# Patient Record
Sex: MALE | Race: WHITE | NOT HISPANIC OR LATINO | Employment: OTHER | URBAN - METROPOLITAN AREA
[De-identification: names, ages, dates, MRNs, and addresses within clinical notes are randomized per-mention and may not be internally consistent; named-entity substitution may affect disease eponyms.]

---

## 2018-02-27 ENCOUNTER — HOSPITAL ENCOUNTER (EMERGENCY)
Facility: HOSPITAL | Age: 73
Discharge: HOME | End: 2018-02-27
Attending: EMERGENCY MEDICINE
Payer: MEDICARE

## 2018-02-27 VITALS
HEIGHT: 70 IN | RESPIRATION RATE: 20 BRPM | OXYGEN SATURATION: 98 % | TEMPERATURE: 98 F | DIASTOLIC BLOOD PRESSURE: 80 MMHG | HEART RATE: 70 BPM | BODY MASS INDEX: 25.77 KG/M2 | WEIGHT: 180 LBS | SYSTOLIC BLOOD PRESSURE: 135 MMHG

## 2018-02-27 DIAGNOSIS — T83.098A: Primary | ICD-10-CM

## 2018-02-27 PROCEDURE — 99283 EMERGENCY DEPT VISIT LOW MDM: CPT

## 2018-02-27 NOTE — ED NOTES
Leg bag changed, bag draining to gravity, NAD noted. Villa education completed. Pt to follow up with pcp for villa removal. [facility]

## 2018-02-27 NOTE — ED PROVIDER NOTES
Encounter Date: 2/27/2018    SCRIBE #1 NOTE: I, Keyla Troy, am scribing for, and in the presence of, Dr. Dutta.       History     Chief Complaint   Patient presents with    catheter bag replacement     72 year old male presents to ed cc of leg bag replacement patient had villa catheter placed 2- 3 weeks ago. Patient was not educated on how to change leg bag states bag has tear in it        has no past medical history on file.    Time seen by provider: 2:58 PM     This is a 72 y.o. male who presents to the emergency department today with complaint of leaking catheter leg bag, in place since a hospital admission on February 19th. He and his relative state they do not know how to change the bag, patient's relative states she has frequent memory loss. He has a recent history of urinary retention requiring Villa catheter sine August 2017.        The history is provided by the patient and a relative.     Review of patient's allergies indicates:  No Known Allergies  History reviewed. No pertinent past medical history.  History reviewed. No pertinent surgical history.  History reviewed. No pertinent family history.  Social History   Substance Use Topics    Smoking status: Former Smoker    Smokeless tobacco: Not on file    Alcohol use No     Review of Systems   Constitutional: Negative for activity change, fatigue and fever.   HENT: Negative for congestion, ear pain and sore throat.    Eyes: Negative for photophobia and redness.   Respiratory: Negative for cough and shortness of breath.    Cardiovascular: Negative for chest pain.   Gastrointestinal: Negative for abdominal pain, diarrhea, nausea and vomiting.   Genitourinary: Negative for decreased urine volume and flank pain.        Urinary retention, Villa catheter leaking.   Musculoskeletal: Negative for back pain and myalgias.   Skin: Negative for rash and wound.   Neurological: Negative for dizziness, weakness, numbness and headaches.       Physical Exam      Initial Vitals [02/27/18 1416]   BP Pulse Resp Temp SpO2   135/80 70 20 98.2 °F (36.8 °C) 98 %      MAP       98.33         Physical Exam    Nursing note and vitals reviewed.  Constitutional: He appears well-developed. No distress.   HENT:   Head: Normocephalic and atraumatic.   Eyes: Conjunctivae are normal.   Neck: Normal range of motion. Neck supple.   Abdominal: Soft. He exhibits no distension.   Genitourinary:   Genitourinary Comments: Sousa catheter in place with leg bag, leaking, urine clear   Musculoskeletal: Normal range of motion.   Neurological: He is alert and oriented to person, place, and time. He has normal strength.   Skin: Skin is warm and dry.   Psychiatric: He has a normal mood and affect.         ED Course   Procedures  Labs Reviewed - No data to display          Medical Decision Making:   ED Management:  Bag is not draining and is leaking urine. Bag was replaced with the patient's supplies and is flowing. He will be discharged.                      Clinical Impression:   The encounter diagnosis was Obstruction of urinary catheter, initial encounter.       I, Phuong Dutta, personally performed the services described in this documentation. All medical record entries made by the scribe were at my direction and in my presence.  I have reviewed the chart and agree that the record reflects my personal performance and is accurate and complete. Phuong Dutta M.D. 4:01 PM02/27/2018                  Phuong Dutta MD  02/27/18 1606

## 2018-02-27 NOTE — ED NOTES
"Per pt vilal placed x3 weeks ago after hospitalization for a fall. Endorses intermitt bladder spasms that improve with "the med the doc gave me." denies any pain with urination, denies any change with urine output or appearance, denies any abd pain. Per pt "they didn't show me how to change my urine bag when I left the hospital." wife @ bedside. Will continue to monitor pt.   "

## 2019-01-01 ENCOUNTER — HOSPITAL ENCOUNTER (OUTPATIENT)
Facility: HOSPITAL | Age: 74
Discharge: HOME | End: 2019-12-06
Attending: EMERGENCY MEDICINE | Admitting: FAMILY MEDICINE
Payer: MEDICARE

## 2019-01-01 VITALS
BODY MASS INDEX: 20.13 KG/M2 | HEART RATE: 50 BPM | OXYGEN SATURATION: 99 % | DIASTOLIC BLOOD PRESSURE: 70 MMHG | HEIGHT: 70 IN | TEMPERATURE: 99 F | WEIGHT: 140.63 LBS | SYSTOLIC BLOOD PRESSURE: 158 MMHG | RESPIRATION RATE: 18 BRPM

## 2019-01-01 DIAGNOSIS — R79.89 ELEVATED TROPONIN: ICD-10-CM

## 2019-01-01 DIAGNOSIS — R00.1 BRADYCARDIA: ICD-10-CM

## 2019-01-01 DIAGNOSIS — R29.6 FALLS FREQUENTLY: ICD-10-CM

## 2019-01-01 DIAGNOSIS — E11.9 TYPE 2 DIABETES MELLITUS WITHOUT COMPLICATION, WITHOUT LONG-TERM CURRENT USE OF INSULIN: ICD-10-CM

## 2019-01-01 DIAGNOSIS — R33.9 URINARY RETENTION: ICD-10-CM

## 2019-01-01 DIAGNOSIS — R53.1 GENERALIZED WEAKNESS: ICD-10-CM

## 2019-01-01 DIAGNOSIS — N30.00 ACUTE CYSTITIS WITHOUT HEMATURIA: ICD-10-CM

## 2019-01-01 DIAGNOSIS — T83.511A URINARY TRACT INFECTION ASSOCIATED WITH INDWELLING URETHRAL CATHETER, INITIAL ENCOUNTER: Primary | ICD-10-CM

## 2019-01-01 DIAGNOSIS — N39.0 URINARY TRACT INFECTION ASSOCIATED WITH INDWELLING URETHRAL CATHETER, INITIAL ENCOUNTER: Primary | ICD-10-CM

## 2019-01-01 LAB
ALBUMIN SERPL BCP-MCNC: 4.1 G/DL (ref 3.5–5.2)
ALP SERPL-CCNC: 85 U/L (ref 55–135)
ALT SERPL W/O P-5'-P-CCNC: 9 U/L (ref 10–44)
ANION GAP SERPL CALC-SCNC: 10 MMOL/L (ref 8–16)
ANION GAP SERPL CALC-SCNC: 11 MMOL/L (ref 8–16)
ANION GAP SERPL CALC-SCNC: 9 MMOL/L (ref 8–16)
ANION GAP SERPL CALC-SCNC: 9 MMOL/L (ref 8–16)
AST SERPL-CCNC: 18 U/L (ref 10–40)
BACTERIA #/AREA URNS HPF: ABNORMAL /HPF
BACTERIA UR CULT: NORMAL
BACTERIA UR CULT: NORMAL
BASOPHILS # BLD AUTO: 0.03 K/UL (ref 0–0.2)
BASOPHILS # BLD AUTO: 0.03 K/UL (ref 0–0.2)
BASOPHILS # BLD AUTO: 0.04 K/UL (ref 0–0.2)
BASOPHILS # BLD AUTO: 0.04 K/UL (ref 0–0.2)
BASOPHILS NFR BLD: 0.3 % (ref 0–1.9)
BASOPHILS NFR BLD: 0.4 % (ref 0–1.9)
BASOPHILS NFR BLD: 0.5 % (ref 0–1.9)
BASOPHILS NFR BLD: 0.6 % (ref 0–1.9)
BILIRUB SERPL-MCNC: 0.6 MG/DL (ref 0.1–1)
BILIRUB UR QL STRIP: NEGATIVE
BUN SERPL-MCNC: 10 MG/DL (ref 8–23)
BUN SERPL-MCNC: 13 MG/DL (ref 8–23)
BUN SERPL-MCNC: 15 MG/DL (ref 8–23)
BUN SERPL-MCNC: 9 MG/DL (ref 8–23)
CALCIUM SERPL-MCNC: 10.2 MG/DL (ref 8.7–10.5)
CALCIUM SERPL-MCNC: 9.3 MG/DL (ref 8.7–10.5)
CALCIUM SERPL-MCNC: 9.4 MG/DL (ref 8.7–10.5)
CALCIUM SERPL-MCNC: 9.7 MG/DL (ref 8.7–10.5)
CHLORIDE SERPL-SCNC: 103 MMOL/L (ref 95–110)
CHLORIDE SERPL-SCNC: 104 MMOL/L (ref 95–110)
CHLORIDE SERPL-SCNC: 104 MMOL/L (ref 95–110)
CHLORIDE SERPL-SCNC: 107 MMOL/L (ref 95–110)
CLARITY UR: ABNORMAL
CO2 SERPL-SCNC: 25 MMOL/L (ref 23–29)
CO2 SERPL-SCNC: 25 MMOL/L (ref 23–29)
CO2 SERPL-SCNC: 26 MMOL/L (ref 23–29)
CO2 SERPL-SCNC: 28 MMOL/L (ref 23–29)
COLOR UR: YELLOW
CREAT SERPL-MCNC: 0.7 MG/DL (ref 0.5–1.4)
CREAT SERPL-MCNC: 0.7 MG/DL (ref 0.5–1.4)
CREAT SERPL-MCNC: 0.8 MG/DL (ref 0.5–1.4)
CREAT SERPL-MCNC: 0.9 MG/DL (ref 0.5–1.4)
DIFFERENTIAL METHOD: ABNORMAL
EOSINOPHIL # BLD AUTO: 0.2 K/UL (ref 0–0.5)
EOSINOPHIL # BLD AUTO: 0.3 K/UL (ref 0–0.5)
EOSINOPHIL # BLD AUTO: 0.3 K/UL (ref 0–0.5)
EOSINOPHIL # BLD AUTO: 0.4 K/UL (ref 0–0.5)
EOSINOPHIL NFR BLD: 2.4 % (ref 0–8)
EOSINOPHIL NFR BLD: 3.9 % (ref 0–8)
EOSINOPHIL NFR BLD: 4.7 % (ref 0–8)
EOSINOPHIL NFR BLD: 5.6 % (ref 0–8)
ERYTHROCYTE [DISTWIDTH] IN BLOOD BY AUTOMATED COUNT: 15.7 % (ref 11.5–14.5)
EST. GFR  (AFRICAN AMERICAN): >60 ML/MIN/1.73 M^2
EST. GFR  (NON AFRICAN AMERICAN): >60 ML/MIN/1.73 M^2
ESTIMATED AVG GLUCOSE: 103 MG/DL (ref 68–131)
GLUCOSE SERPL-MCNC: 106 MG/DL (ref 70–110)
GLUCOSE SERPL-MCNC: 111 MG/DL (ref 70–110)
GLUCOSE SERPL-MCNC: 112 MG/DL (ref 70–110)
GLUCOSE SERPL-MCNC: 91 MG/DL (ref 70–110)
GLUCOSE UR QL STRIP: NEGATIVE
HBA1C MFR BLD HPLC: 5.2 % (ref 4–5.6)
HCT VFR BLD AUTO: 31.2 % (ref 40–54)
HCT VFR BLD AUTO: 34.8 % (ref 40–54)
HCT VFR BLD AUTO: 35 % (ref 40–54)
HCT VFR BLD AUTO: 36 % (ref 40–54)
HGB BLD-MCNC: 10.9 G/DL (ref 14–18)
HGB BLD-MCNC: 10.9 G/DL (ref 14–18)
HGB BLD-MCNC: 11.4 G/DL (ref 14–18)
HGB BLD-MCNC: 9.9 G/DL (ref 14–18)
HGB UR QL STRIP: ABNORMAL
HYALINE CASTS #/AREA URNS LPF: 1 /LPF
KETONES UR QL STRIP: NEGATIVE
LEUKOCYTE ESTERASE UR QL STRIP: ABNORMAL
LYMPHOCYTES # BLD AUTO: 0.7 K/UL (ref 1–4.8)
LYMPHOCYTES # BLD AUTO: 0.7 K/UL (ref 1–4.8)
LYMPHOCYTES # BLD AUTO: 0.8 K/UL (ref 1–4.8)
LYMPHOCYTES # BLD AUTO: 1 K/UL (ref 1–4.8)
LYMPHOCYTES NFR BLD: 10.1 % (ref 18–48)
LYMPHOCYTES NFR BLD: 10.2 % (ref 18–48)
LYMPHOCYTES NFR BLD: 15 % (ref 18–48)
LYMPHOCYTES NFR BLD: 8 % (ref 18–48)
MAGNESIUM SERPL-MCNC: 1.9 MG/DL (ref 1.6–2.6)
MAGNESIUM SERPL-MCNC: 2 MG/DL (ref 1.6–2.6)
MAGNESIUM SERPL-MCNC: 2.1 MG/DL (ref 1.6–2.6)
MAGNESIUM SERPL-MCNC: 2.1 MG/DL (ref 1.6–2.6)
MCH RBC QN AUTO: 26 PG (ref 27–31)
MCH RBC QN AUTO: 26.3 PG (ref 27–31)
MCH RBC QN AUTO: 26.5 PG (ref 27–31)
MCH RBC QN AUTO: 26.6 PG (ref 27–31)
MCHC RBC AUTO-ENTMCNC: 31.1 G/DL (ref 32–36)
MCHC RBC AUTO-ENTMCNC: 31.3 G/DL (ref 32–36)
MCHC RBC AUTO-ENTMCNC: 31.7 G/DL (ref 32–36)
MCHC RBC AUTO-ENTMCNC: 31.7 G/DL (ref 32–36)
MCV RBC AUTO: 83 FL (ref 82–98)
MCV RBC AUTO: 84 FL (ref 82–98)
MICROSCOPIC COMMENT: ABNORMAL
MONOCYTES # BLD AUTO: 0.7 K/UL (ref 0.3–1)
MONOCYTES # BLD AUTO: 0.9 K/UL (ref 0.3–1)
MONOCYTES NFR BLD: 11.4 % (ref 4–15)
MONOCYTES NFR BLD: 12.1 % (ref 4–15)
MONOCYTES NFR BLD: 14 % (ref 4–15)
MONOCYTES NFR BLD: 8.5 % (ref 4–15)
NEUTROPHILS # BLD AUTO: 4.2 K/UL (ref 1.8–7.7)
NEUTROPHILS # BLD AUTO: 5.2 K/UL (ref 1.8–7.7)
NEUTROPHILS # BLD AUTO: 5.9 K/UL (ref 1.8–7.7)
NEUTROPHILS # BLD AUTO: 7 K/UL (ref 1.8–7.7)
NEUTROPHILS NFR BLD: 64.8 % (ref 38–73)
NEUTROPHILS NFR BLD: 72.7 % (ref 38–73)
NEUTROPHILS NFR BLD: 74 % (ref 38–73)
NEUTROPHILS NFR BLD: 80.8 % (ref 38–73)
NITRITE UR QL STRIP: POSITIVE
PH UR STRIP: 7 [PH] (ref 5–8)
PLATELET # BLD AUTO: 294 K/UL (ref 150–350)
PLATELET # BLD AUTO: 315 K/UL (ref 150–350)
PLATELET # BLD AUTO: 338 K/UL (ref 150–350)
PLATELET # BLD AUTO: 341 K/UL (ref 150–350)
PMV BLD AUTO: 8.9 FL (ref 9.2–12.9)
PMV BLD AUTO: 9 FL (ref 9.2–12.9)
POCT GLUCOSE: 100 MG/DL (ref 70–110)
POCT GLUCOSE: 101 MG/DL (ref 70–110)
POCT GLUCOSE: 108 MG/DL (ref 70–110)
POCT GLUCOSE: 110 MG/DL (ref 70–110)
POCT GLUCOSE: 114 MG/DL (ref 70–110)
POCT GLUCOSE: 115 MG/DL (ref 70–110)
POCT GLUCOSE: 122 MG/DL (ref 70–110)
POCT GLUCOSE: 170 MG/DL (ref 70–110)
POCT GLUCOSE: 91 MG/DL (ref 70–110)
POCT GLUCOSE: 97 MG/DL (ref 70–110)
POTASSIUM SERPL-SCNC: 3.4 MMOL/L (ref 3.5–5.1)
POTASSIUM SERPL-SCNC: 3.8 MMOL/L (ref 3.5–5.1)
POTASSIUM SERPL-SCNC: 3.9 MMOL/L (ref 3.5–5.1)
POTASSIUM SERPL-SCNC: 4.5 MMOL/L (ref 3.5–5.1)
PROT SERPL-MCNC: 7.5 G/DL (ref 6–8.4)
PROT UR QL STRIP: ABNORMAL
RBC # BLD AUTO: 3.74 M/UL (ref 4.6–6.2)
RBC # BLD AUTO: 4.15 M/UL (ref 4.6–6.2)
RBC # BLD AUTO: 4.19 M/UL (ref 4.6–6.2)
RBC # BLD AUTO: 4.28 M/UL (ref 4.6–6.2)
RBC #/AREA URNS HPF: 1 /HPF (ref 0–4)
SODIUM SERPL-SCNC: 139 MMOL/L (ref 136–145)
SODIUM SERPL-SCNC: 140 MMOL/L (ref 136–145)
SODIUM SERPL-SCNC: 141 MMOL/L (ref 136–145)
SODIUM SERPL-SCNC: 141 MMOL/L (ref 136–145)
SP GR UR STRIP: 1.02 (ref 1–1.03)
T4 FREE SERPL-MCNC: 1.05 NG/DL (ref 0.71–1.51)
TROPONIN I SERPL DL<=0.01 NG/ML-MCNC: 0.05 NG/ML (ref 0–0.03)
TROPONIN I SERPL DL<=0.01 NG/ML-MCNC: 0.06 NG/ML (ref 0–0.03)
TROPONIN I SERPL DL<=0.01 NG/ML-MCNC: 0.07 NG/ML (ref 0–0.03)
TSH SERPL DL<=0.005 MIU/L-ACNC: 0.3 UIU/ML (ref 0.4–4)
URN SPEC COLLECT METH UR: ABNORMAL
UROBILINOGEN UR STRIP-ACNC: NEGATIVE EU/DL
WBC # BLD AUTO: 6.45 K/UL (ref 3.9–12.7)
WBC # BLD AUTO: 7.21 K/UL (ref 3.9–12.7)
WBC # BLD AUTO: 7.97 K/UL (ref 3.9–12.7)
WBC # BLD AUTO: 8.7 K/UL (ref 3.9–12.7)
WBC #/AREA URNS HPF: 40 /HPF (ref 0–5)
WBC CLUMPS URNS QL MICRO: ABNORMAL

## 2019-01-01 PROCEDURE — 86580 TB INTRADERMAL TEST: CPT | Performed by: FAMILY MEDICINE

## 2019-01-01 PROCEDURE — 83735 ASSAY OF MAGNESIUM: CPT

## 2019-01-01 PROCEDURE — 63600175 PHARM REV CODE 636 W HCPCS: Performed by: PHYSICIAN ASSISTANT

## 2019-01-01 PROCEDURE — 85025 COMPLETE CBC W/AUTO DIFF WBC: CPT

## 2019-01-01 PROCEDURE — G0378 HOSPITAL OBSERVATION PER HR: HCPCS

## 2019-01-01 PROCEDURE — 97530 THERAPEUTIC ACTIVITIES: CPT

## 2019-01-01 PROCEDURE — 83036 HEMOGLOBIN GLYCOSYLATED A1C: CPT

## 2019-01-01 PROCEDURE — 84484 ASSAY OF TROPONIN QUANT: CPT

## 2019-01-01 PROCEDURE — 25000003 PHARM REV CODE 250: Performed by: NURSE PRACTITIONER

## 2019-01-01 PROCEDURE — 81000 URINALYSIS NONAUTO W/SCOPE: CPT

## 2019-01-01 PROCEDURE — 99285 EMERGENCY DEPT VISIT HI MDM: CPT | Mod: 25

## 2019-01-01 PROCEDURE — 84484 ASSAY OF TROPONIN QUANT: CPT | Mod: 91

## 2019-01-01 PROCEDURE — 80048 BASIC METABOLIC PNL TOTAL CA: CPT

## 2019-01-01 PROCEDURE — 97116 GAIT TRAINING THERAPY: CPT | Mod: 59

## 2019-01-01 PROCEDURE — 82962 GLUCOSE BLOOD TEST: CPT

## 2019-01-01 PROCEDURE — 93005 ELECTROCARDIOGRAM TRACING: CPT

## 2019-01-01 PROCEDURE — 96361 HYDRATE IV INFUSION ADD-ON: CPT

## 2019-01-01 PROCEDURE — 80053 COMPREHEN METABOLIC PANEL: CPT

## 2019-01-01 PROCEDURE — 97110 THERAPEUTIC EXERCISES: CPT

## 2019-01-01 PROCEDURE — 97116 GAIT TRAINING THERAPY: CPT

## 2019-01-01 PROCEDURE — 36415 COLL VENOUS BLD VENIPUNCTURE: CPT

## 2019-01-01 PROCEDURE — 97161 PT EVAL LOW COMPLEX 20 MIN: CPT

## 2019-01-01 PROCEDURE — 84443 ASSAY THYROID STIM HORMONE: CPT

## 2019-01-01 PROCEDURE — 97165 OT EVAL LOW COMPLEX 30 MIN: CPT

## 2019-01-01 PROCEDURE — 63600175 PHARM REV CODE 636 W HCPCS: Performed by: NURSE PRACTITIONER

## 2019-01-01 PROCEDURE — 84439 ASSAY OF FREE THYROXINE: CPT

## 2019-01-01 PROCEDURE — 96365 THER/PROPH/DIAG IV INF INIT: CPT

## 2019-01-01 PROCEDURE — 96372 THER/PROPH/DIAG INJ SC/IM: CPT

## 2019-01-01 PROCEDURE — 30200315 PPD INTRADERMAL TEST REV CODE 302: Performed by: FAMILY MEDICINE

## 2019-01-01 PROCEDURE — 96376 TX/PRO/DX INJ SAME DRUG ADON: CPT

## 2019-01-01 PROCEDURE — 87086 URINE CULTURE/COLONY COUNT: CPT

## 2019-01-01 RX ORDER — AMOXICILLIN AND CLAVULANATE POTASSIUM 875; 125 MG/1; MG/1
1 TABLET, FILM COATED ORAL EVERY 12 HOURS
Status: DISCONTINUED | OUTPATIENT
Start: 2019-01-01 | End: 2019-01-01 | Stop reason: HOSPADM

## 2019-01-01 RX ORDER — BROMFENAC SODIUM 0.81 MG/ML
SOLUTION/ DROPS OPHTHALMIC 2 TIMES DAILY
Status: ON HOLD | COMMUNITY
End: 2019-01-01

## 2019-01-01 RX ORDER — METOPROLOL SUCCINATE 50 MG/1
50 TABLET, EXTENDED RELEASE ORAL 3 TIMES DAILY
COMMUNITY

## 2019-01-01 RX ORDER — SERTRALINE HYDROCHLORIDE 100 MG/1
100 TABLET, FILM COATED ORAL DAILY
Status: DISCONTINUED | OUTPATIENT
Start: 2019-01-01 | End: 2019-01-01 | Stop reason: HOSPADM

## 2019-01-01 RX ORDER — MELOXICAM 7.5 MG/1
7.5 TABLET ORAL DAILY
COMMUNITY

## 2019-01-01 RX ORDER — AMOXICILLIN AND CLAVULANATE POTASSIUM 875; 125 MG/1; MG/1
1 TABLET, FILM COATED ORAL EVERY 12 HOURS
Qty: 10 TABLET | Refills: 0
Start: 2019-01-01 | End: 2019-01-01

## 2019-01-01 RX ORDER — ACETAMINOPHEN 325 MG/1
650 TABLET ORAL EVERY 4 HOURS PRN
Status: DISCONTINUED | OUTPATIENT
Start: 2019-01-01 | End: 2019-01-01 | Stop reason: HOSPADM

## 2019-01-01 RX ORDER — METOPROLOL SUCCINATE 50 MG/1
50 TABLET, EXTENDED RELEASE ORAL 2 TIMES DAILY
Status: DISCONTINUED | OUTPATIENT
Start: 2019-01-01 | End: 2019-01-01 | Stop reason: HOSPADM

## 2019-01-01 RX ORDER — ENOXAPARIN SODIUM 100 MG/ML
40 INJECTION SUBCUTANEOUS EVERY 24 HOURS
Status: DISCONTINUED | OUTPATIENT
Start: 2019-01-01 | End: 2019-01-01 | Stop reason: HOSPADM

## 2019-01-01 RX ORDER — GLUCAGON 1 MG
1 KIT INJECTION
Status: DISCONTINUED | OUTPATIENT
Start: 2019-01-01 | End: 2019-01-01 | Stop reason: HOSPADM

## 2019-01-01 RX ORDER — ATORVASTATIN CALCIUM 20 MG/1
20 TABLET, FILM COATED ORAL NIGHTLY
Status: DISCONTINUED | OUTPATIENT
Start: 2019-01-01 | End: 2019-01-01 | Stop reason: HOSPADM

## 2019-01-01 RX ORDER — ONDANSETRON 4 MG/1
4 TABLET, ORALLY DISINTEGRATING ORAL EVERY 6 HOURS PRN
Status: DISCONTINUED | OUTPATIENT
Start: 2019-01-01 | End: 2019-01-01 | Stop reason: HOSPADM

## 2019-01-01 RX ORDER — ONDANSETRON 2 MG/ML
4 INJECTION INTRAMUSCULAR; INTRAVENOUS EVERY 6 HOURS PRN
Status: DISCONTINUED | OUTPATIENT
Start: 2019-01-01 | End: 2019-01-01 | Stop reason: HOSPADM

## 2019-01-01 RX ORDER — ATORVASTATIN CALCIUM 20 MG/1
20 TABLET, FILM COATED ORAL NIGHTLY
COMMUNITY

## 2019-01-01 RX ORDER — ONDANSETRON 4 MG/1
4 TABLET, FILM COATED ORAL EVERY 8 HOURS
COMMUNITY

## 2019-01-01 RX ORDER — IBUPROFEN 200 MG
24 TABLET ORAL
Status: DISCONTINUED | OUTPATIENT
Start: 2019-01-01 | End: 2019-01-01 | Stop reason: HOSPADM

## 2019-01-01 RX ORDER — DOCUSATE SODIUM 100 MG/1
200 CAPSULE, LIQUID FILLED ORAL DAILY
Status: DISCONTINUED | OUTPATIENT
Start: 2019-01-01 | End: 2019-01-01 | Stop reason: HOSPADM

## 2019-01-01 RX ORDER — SERTRALINE HYDROCHLORIDE 100 MG/1
100 TABLET, FILM COATED ORAL DAILY
COMMUNITY

## 2019-01-01 RX ORDER — HYDRALAZINE HYDROCHLORIDE 50 MG/1
50 TABLET, FILM COATED ORAL EVERY 8 HOURS
COMMUNITY

## 2019-01-01 RX ORDER — OLANZAPINE 2.5 MG/1
2.5 TABLET ORAL NIGHTLY
Status: DISCONTINUED | OUTPATIENT
Start: 2019-01-01 | End: 2019-01-01 | Stop reason: HOSPADM

## 2019-01-01 RX ORDER — IBUPROFEN 200 MG
16 TABLET ORAL
Status: DISCONTINUED | OUTPATIENT
Start: 2019-01-01 | End: 2019-01-01 | Stop reason: HOSPADM

## 2019-01-01 RX ORDER — DOCUSATE SODIUM 100 MG/1
240 CAPSULE, LIQUID FILLED ORAL DAILY
COMMUNITY

## 2019-01-01 RX ORDER — OXYBUTYNIN CHLORIDE 5 MG/1
5 TABLET ORAL 2 TIMES DAILY
COMMUNITY

## 2019-01-01 RX ORDER — CYCLOSPORINE 0.5 MG/ML
1 EMULSION OPHTHALMIC 2 TIMES DAILY
Qty: 1 EACH | Refills: 11
Start: 2019-01-01 | End: 2020-12-05

## 2019-01-01 RX ORDER — INSULIN ASPART 100 [IU]/ML
0-5 INJECTION, SOLUTION INTRAVENOUS; SUBCUTANEOUS
Status: DISCONTINUED | OUTPATIENT
Start: 2019-01-01 | End: 2019-01-01 | Stop reason: HOSPADM

## 2019-01-01 RX ORDER — SOLIFENACIN SUCCINATE 10 MG/1
10 TABLET, FILM COATED ORAL DAILY
Status: ON HOLD | COMMUNITY
End: 2019-01-01 | Stop reason: HOSPADM

## 2019-01-01 RX ORDER — OLANZAPINE 2.5 MG/1
2.5 TABLET ORAL NIGHTLY
COMMUNITY

## 2019-01-01 RX ORDER — HYDRALAZINE HYDROCHLORIDE 25 MG/1
50 TABLET, FILM COATED ORAL EVERY 8 HOURS
Status: DISCONTINUED | OUTPATIENT
Start: 2019-01-01 | End: 2019-01-01 | Stop reason: HOSPADM

## 2019-01-01 RX ORDER — TAMSULOSIN HYDROCHLORIDE 0.4 MG/1
0.4 CAPSULE ORAL DAILY
Status: DISCONTINUED | OUTPATIENT
Start: 2019-01-01 | End: 2019-01-01 | Stop reason: HOSPADM

## 2019-01-01 RX ORDER — TRAZODONE HYDROCHLORIDE 150 MG/1
150 TABLET ORAL NIGHTLY
COMMUNITY

## 2019-01-01 RX ORDER — NITROGLYCERIN 0.4 MG/1
0.4 TABLET SUBLINGUAL
COMMUNITY

## 2019-01-01 RX ORDER — TAMSULOSIN HYDROCHLORIDE 0.4 MG/1
0.4 CAPSULE ORAL DAILY
COMMUNITY

## 2019-01-01 RX ORDER — SODIUM CHLORIDE 0.9 % (FLUSH) 0.9 %
10 SYRINGE (ML) INJECTION
Status: DISCONTINUED | OUTPATIENT
Start: 2019-01-01 | End: 2019-01-01 | Stop reason: HOSPADM

## 2019-01-01 RX ORDER — METFORMIN HYDROCHLORIDE 1000 MG/1
500 TABLET ORAL 2 TIMES DAILY WITH MEALS
COMMUNITY

## 2019-01-01 RX ADMIN — TUBERCULIN PURIFIED PROTEIN DERIVATIVE 5 UNITS: 5 INJECTION INTRADERMAL at 12:12

## 2019-01-01 RX ADMIN — HYDRALAZINE HYDROCHLORIDE 50 MG: 25 TABLET, FILM COATED ORAL at 09:12

## 2019-01-01 RX ADMIN — METOPROLOL SUCCINATE 50 MG: 50 TABLET, EXTENDED RELEASE ORAL at 09:12

## 2019-01-01 RX ADMIN — OLANZAPINE 2.5 MG: 2.5 TABLET, FILM COATED ORAL at 09:12

## 2019-01-01 RX ADMIN — HYDRALAZINE HYDROCHLORIDE 50 MG: 25 TABLET, FILM COATED ORAL at 01:12

## 2019-01-01 RX ADMIN — CEFTRIAXONE 1 G: 1 INJECTION, SOLUTION INTRAVENOUS at 01:12

## 2019-01-01 RX ADMIN — SODIUM CHLORIDE 500 ML: 0.9 INJECTION, SOLUTION INTRAVENOUS at 12:12

## 2019-01-01 RX ADMIN — SERTRALINE HYDROCHLORIDE 100 MG: 50 TABLET ORAL at 10:12

## 2019-01-01 RX ADMIN — OLANZAPINE 2.5 MG: 2.5 TABLET, FILM COATED ORAL at 08:12

## 2019-01-01 RX ADMIN — METOPROLOL SUCCINATE 50 MG: 50 TABLET, EXTENDED RELEASE ORAL at 08:12

## 2019-01-01 RX ADMIN — ATORVASTATIN CALCIUM 20 MG: 20 TABLET, FILM COATED ORAL at 08:12

## 2019-01-01 RX ADMIN — DOCUSATE SODIUM 200 MG: 100 CAPSULE, LIQUID FILLED ORAL at 09:12

## 2019-01-01 RX ADMIN — TRAZODONE HYDROCHLORIDE 150 MG: 100 TABLET ORAL at 08:12

## 2019-01-01 RX ADMIN — SERTRALINE HYDROCHLORIDE 100 MG: 50 TABLET ORAL at 09:12

## 2019-01-01 RX ADMIN — AMOXICILLIN AND CLAVULANATE POTASSIUM 1 TABLET: 875; 125 TABLET, FILM COATED ORAL at 09:12

## 2019-01-01 RX ADMIN — ATORVASTATIN CALCIUM 20 MG: 20 TABLET, FILM COATED ORAL at 09:12

## 2019-01-01 RX ADMIN — HYDRALAZINE HYDROCHLORIDE 50 MG: 25 TABLET, FILM COATED ORAL at 06:12

## 2019-01-01 RX ADMIN — DOCUSATE SODIUM 200 MG: 100 CAPSULE, LIQUID FILLED ORAL at 10:12

## 2019-01-01 RX ADMIN — CEFTRIAXONE 1 G: 1 INJECTION, SOLUTION INTRAVENOUS at 03:12

## 2019-01-01 RX ADMIN — HYDRALAZINE HYDROCHLORIDE 50 MG: 25 TABLET, FILM COATED ORAL at 03:12

## 2019-01-01 RX ADMIN — METOPROLOL SUCCINATE 50 MG: 50 TABLET, EXTENDED RELEASE ORAL at 10:12

## 2019-01-01 RX ADMIN — TAMSULOSIN HYDROCHLORIDE 0.4 MG: 0.4 CAPSULE ORAL at 09:12

## 2019-01-01 RX ADMIN — TRAZODONE HYDROCHLORIDE 150 MG: 100 TABLET ORAL at 09:12

## 2019-01-01 RX ADMIN — ENOXAPARIN SODIUM 40 MG: 100 INJECTION SUBCUTANEOUS at 05:12

## 2019-12-03 PROBLEM — R79.89 ELEVATED TROPONIN: Status: ACTIVE | Noted: 2019-01-01

## 2019-12-03 PROBLEM — E11.9 TYPE 2 DIABETES MELLITUS WITHOUT COMPLICATION, WITHOUT LONG-TERM CURRENT USE OF INSULIN: Status: ACTIVE | Noted: 2019-01-01

## 2019-12-03 PROBLEM — I10 HYPERTENSION: Status: ACTIVE | Noted: 2019-01-01

## 2019-12-03 PROBLEM — R29.6 FALLS FREQUENTLY: Status: ACTIVE | Noted: 2019-01-01

## 2019-12-03 PROBLEM — R33.9 URINARY RETENTION: Status: ACTIVE | Noted: 2019-01-01

## 2019-12-03 PROBLEM — R53.81 DEBILITY: Status: ACTIVE | Noted: 2019-01-01

## 2019-12-03 PROBLEM — N30.00 ACUTE CYSTITIS WITHOUT HEMATURIA: Status: ACTIVE | Noted: 2019-01-01

## 2019-12-03 PROBLEM — Z97.8 CHRONIC INDWELLING FOLEY CATHETER: Status: ACTIVE | Noted: 2019-01-01

## 2019-12-03 NOTE — PLAN OF CARE
met with patient in ED bed 26 to discuss his admission to ED. Patient and his significant other Fiordaliza Underwood,[phone] admitted to ED due to numerous falls and seeking admission into NH placement. Patient was alert and oriented during assessment but directed SW to speak with significant other,Fiordaliza.     Fiordaliza was able to verify demographics. Prior to admission Fiordaliza stated that patient had been partially dependent and living with her for the past 15 months. Fiordaliza states she believes her significant other has Dementia. Fiordaliza is primary caregiver for patient in the home. Patient does have medical equipment ( cane,rollator,and standard walker) in the home. Patient has had  services in the past unable to provide company name.     Patient's additional emergency contact is Eden Carrillo (friend) [phone]. Patient has no issues affording medications at this time. Patient has no social needs at this time.  provided Fiordaliza with contact information and encouraged her to contact if any questions or needs arise. Fiordaliza verbalized understanding for her and patient.     Patient is being admitted into Observation and is requesting NH placement through [facility] with significant other,Fiordaliza. SW will initiate referral.      12/03/19 1512   Discharge Assessment   Assessment Type Discharge Planning Assessment   Confirmed/corrected address and phone number on facesheet? Yes   Assessment information obtained from? Patient;Caregiver   Prior to hospitilization cognitive status: Alert/Oriented   Prior to hospitalization functional status: Partially Dependent   Current cognitive status: Alert/Oriented   Current Functional Status: Partially Dependent   Lives With significant other  (Fiordaliza Arthur, significant other, [phone])   Able to Return to Prior Arrangements no   Is patient able to care for self after discharge? No   Who are your caregiver(s) and their phone number(s)? Fiordaliza  Kj,(so) [phone], Eden Carrillo,[phone]   Patient's perception of discharge disposition acute care hospital   Readmission Within the Last 30 Days no previous admission in last 30 days   Patient currently being followed by outpatient case management? No   Patient currently receives any other outside agency services? No   Equipment Currently Used at Home cane, straight;rollator;walker, standard   Do you have any problems affording any of your prescribed medications? No   Is the patient taking medications as prescribed? yes   Does the patient have transportation home? Yes   Transportation Anticipated family or friend will provide   Does the patient receive services at the [facility]? No   Discharge Plan A New Nursing Home placement - [facility] care facility   Discharge Plan B Skilled Nursing Facility   DME Needed Upon Discharge  other (see comments)  (TBD)   Patient/Family in Agreement with Plan yes

## 2019-12-03 NOTE — SUBJECTIVE & OBJECTIVE
Past Medical History:   Diagnosis Date    Diabetes mellitus     Hypertension     Urinary retention        No past surgical history on file.    Review of patient's allergies indicates:  No Known Allergies    No current facility-administered medications on file prior to encounter.      Current Outpatient Medications on File Prior to Encounter   Medication Sig    atorvastatin (LIPITOR) 20 MG tablet Take 20 mg by mouth every evening.    docusate sodium (COLACE) 100 MG capsule Take 240 mg by mouth once daily.    hydrALAZINE (APRESOLINE) 50 MG tablet Take 50 mg by mouth every 8 (eight) hours.    metFORMIN (GLUCOPHAGE) 1000 MG tablet Take 500 mg by mouth 2 (two) times daily with meals.    metoprolol succinate (TOPROL-XL) 50 MG 24 hr tablet Take 50 mg by mouth 3 (three) times daily.    OLANZapine (ZYPREXA) 2.5 MG tablet Take 2.5 mg by mouth every evening.    oxybutynin (DITROPAN) 5 MG Tab Take 5 mg by mouth 2 (two) times daily.    sertraline (ZOLOFT) 100 MG tablet Take 100 mg by mouth once daily.    solifenacin (VESICARE) 10 MG tablet Take 10 mg by mouth once daily.    tamsulosin (FLOMAX) 0.4 mg Cap Take 0.4 mg by mouth once daily.    traZODone (DESYREL) 150 MG tablet Take 150 mg by mouth nightly.    bromfenac (PROLENSA) 0.07 % Drop Apply to eye 2 (two) times daily.    meloxicam (MOBIC) 7.5 MG tablet Take 7.5 mg by mouth once daily.    nitroGLYCERIN (NITROSTAT) 0.4 MG SL tablet Place 0.4 mg under the tongue every 15 (fifteen) minutes as needed for Chest pain.    ondansetron (ZOFRAN) 4 MG tablet Take 4 mg by mouth every 8 (eight) hours.     Family History     Family history is unknown by patient.        Tobacco Use    Smoking status: Former Smoker    Smokeless tobacco: Never Used   Substance and Sexual Activity    Alcohol use: No    Drug use: No    Sexual activity: Not on file     Review of Systems   Constitutional: Positive for activity change and fatigue. Negative for appetite change, chills,  diaphoresis and fever.   HENT: Negative for trouble swallowing.    Eyes: Negative for visual disturbance.   Respiratory: Positive for cough (non productive). Negative for shortness of breath and wheezing.    Cardiovascular: Negative for chest pain, palpitations and leg swelling.   Gastrointestinal: Negative for abdominal distention, abdominal pain, blood in stool, constipation, diarrhea, nausea and vomiting.   Genitourinary: Positive for difficulty urinating. Negative for hematuria, penile swelling and scrotal swelling.        Chronic villa that is changed by his friend.    Musculoskeletal: Positive for gait problem. Negative for arthralgias.   Skin: Negative for color change.   Neurological: Positive for weakness. Negative for dizziness, syncope, speech difficulty, light-headedness and headaches.   Hematological: Does not bruise/bleed easily.   Psychiatric/Behavioral: Negative for agitation, confusion and hallucinations. The patient is not nervous/anxious.      Objective:     Vital Signs (Most Recent):  Temp: 98.2 °F (36.8 °C) (12/03/19 1105)  Pulse: (!) 52 (12/03/19 1302)  Resp: 19 (12/03/19 1302)  BP: (!) 199/82 (12/03/19 1302)  SpO2: 100 % (12/03/19 1302) Vital Signs (24h Range):  Temp:  [98.2 °F (36.8 °C)] 98.2 °F (36.8 °C)  Pulse:  [49-54] 52  Resp:  [12-35] 19  SpO2:  [99 %-100 %] 100 %  BP: (159-199)/(70-82) 199/82     Weight: 81.6 kg (180 lb)  Body mass index is 25.83 kg/m².    Physical Exam   Constitutional: He is oriented to person, place, and time. He has a sickly appearance. No distress.   HENT:   Head: Normocephalic and atraumatic.   Eyes: Pupils are equal, round, and reactive to light.   Neck: Normal range of motion. No JVD present.   Cardiovascular: Normal rate, regular rhythm and intact distal pulses.   No murmur heard.  Pulmonary/Chest: Effort normal and breath sounds normal. No respiratory distress. He has no wheezes.   Abdominal: Soft. Bowel sounds are normal. He exhibits no distension. There is  no tenderness. There is no guarding.   Genitourinary:   Genitourinary Comments: Chronic villa catheter   Musculoskeletal: He exhibits no edema or tenderness.   Neurological: He is alert and oriented to person, place, and time.   Skin: Skin is warm and dry. Capillary refill takes less than 2 seconds. He is not diaphoretic.   Psychiatric: He has a normal mood and affect. His behavior is normal. Judgment and thought content normal.   Nursing note and vitals reviewed.        CRANIAL NERVES     CN III, IV, VI   Pupils are equal, round, and reactive to light.       Significant Labs:   BMP:   Recent Labs   Lab 12/03/19  1127   *      K 4.5      CO2 25   BUN 15   CREATININE 0.9   CALCIUM 10.2   MG 1.9     CBC:   Recent Labs   Lab 12/03/19  1127   WBC 8.70   HGB 11.4*   HCT 36.0*        Magnesium:   Recent Labs   Lab 12/03/19  1127   MG 1.9     Troponin:   Recent Labs   Lab 12/03/19  1127   TROPONINI 0.073*     TSH:   Recent Labs   Lab 12/03/19  1127   TSH 0.300*     Urine Culture: No results for input(s): LABURIN in the last 48 hours.  Urine Studies:   Recent Labs   Lab 12/03/19  1152   COLORU Yellow   APPEARANCEUA Hazy*   PHUR 7.0   SPECGRAV 1.020   PROTEINUA Trace*   GLUCUA Negative   KETONESU Negative   BILIRUBINUA Negative   OCCULTUA Trace*   NITRITE Positive*   UROBILINOGEN Negative   LEUKOCYTESUR 3+*   RBCUA 1   WBCUA 40*   BACTERIA Few*   HYALINECASTS 1       Significant Imaging: I have reviewed all pertinent imaging results/findings within the past 24 hours.

## 2019-12-03 NOTE — HPI
"Mr. Reddy Barron is a 73 y/o male who lives in [place], [place] at his residence. His PCP is Dr. Edgar Fang. He has a past medical history of hypertension, urinary retention, chronic indwelling villa, and diabetes. No documented surgeries noted. He denies smoking cigarettes, drinking alcohol, and he denies illicit drug use.    He presents to [facility] with complaints of frequent falls. The patient states he has had numerous falls recently because his legs "just give out" and almost had another fall today as well. Patient states he uses a walker to get around "anywhere further than a few feet." He denies any pain currently, headache, dizziness or LOC but does mention he is sensitive to coldness. The patient states he currently has a villa catheter in which was placed about a month or so ago, since he was in the hospital at [facility]. Patient states he is unable to bathe himself or get around as much as he would like to and is requesting to be placed in a nursing home.     The ED workup notes nothing acute on CXR. The labs note a stable hemoglobin, elevated troponin, and UTI as confirmed on U/A. Will admit to the [facility] department for further care.   "

## 2019-12-03 NOTE — ASSESSMENT & PLAN NOTE
Hold oral medications Metformin. Check hemoglobin A1c. Diabetic and cardiac diet ordered. Check BG 4 times daily. We will cover with prn low dose insulin aspart.

## 2019-12-03 NOTE — ED TRIAGE NOTES
Pt presented to ED with c/o fall. Pt states he has trouble keeping his balance ,his legs are weak and can't support his weight, pt unsure of all meds taken and states he hasn't followed up with the doctor due to unable to reach him. Pt states she lives alone and has a friend that helps with care .Pt denies any chest pain , sob , ha or dizziness ,n/v. Pt states he is hypertensive, and diabetic

## 2019-12-03 NOTE — ASSESSMENT & PLAN NOTE
Resume home medications. He takes Metoprolol 50 mg PO BID and he takes Hydralazine 50 mg TID. Monitor.

## 2019-12-03 NOTE — ASSESSMENT & PLAN NOTE
Urinary retention  Acute cystitis without hematuria    Will have the nursing staff change the villa as he has a UTI. Cont Ceftriaxone given in the ED. Monitor. Will also cont Flomax 0.4 mg PO daily.

## 2019-12-03 NOTE — H&P
"[facility]  History & Physical    Patient Name: Reddy Barron  MRN: [id number]  Admission Date: 12/3/2019  Attending Physician: Aline Singer*   Primary Care Provider: Edgar Fang MD         Patient information was obtained from patient, past medical records and ER records.     Subjective:     Principal Problem:Falls frequently    Chief Complaint:   Chief Complaint   Patient presents with    Fall     pt and his friend were brought here by a friend to try to get admitted to [facility]. Friend was told it would be easiest to get admitted there from the hospital. Reports frequent falls at home. Denies pain.         HPI: Mr. Reddy Barron is a 73 y/o male who lives in [place], [place] at his residence. His PCP is Dr. Edgar Fang. He has a past medical history of hypertension, urinary retention, chronic indwelling villa, and diabetes. No documented surgeries noted. He denies smoking cigarettes, drinking alcohol, and he denies illicit drug use.    He presents to [facility] with complaints of frequent falls. The patient states he has had numerous falls recently because his legs "just give out" and almost had another fall today as well. Patient states he uses a walker to get around "anywhere further than a few feet." He denies any pain currently, headache, dizziness or LOC but does mention he is sensitive to coldness. The patient states he currently has a villa catheter in which was placed about a month or so ago, since he was in the hospital at [facility]. Patient states he is unable to bathe himself or get around as much as he would like to and is requesting to be placed in a nursing home.     The ED workup notes nothing acute on CXR. The labs note a stable hemoglobin, elevated troponin, and UTI as confirmed on U/A. Will admit to the [facility] department for further care.     Past Medical History:   Diagnosis Date    " Diabetes mellitus     Hypertension     Urinary retention        No past surgical history on file.    Review of patient's allergies indicates:  No Known Allergies    No current facility-administered medications on file prior to encounter.      Current Outpatient Medications on File Prior to Encounter   Medication Sig    atorvastatin (LIPITOR) 20 MG tablet Take 20 mg by mouth every evening.    docusate sodium (COLACE) 100 MG capsule Take 240 mg by mouth once daily.    hydrALAZINE (APRESOLINE) 50 MG tablet Take 50 mg by mouth every 8 (eight) hours.    metFORMIN (GLUCOPHAGE) 1000 MG tablet Take 500 mg by mouth 2 (two) times daily with meals.    metoprolol succinate (TOPROL-XL) 50 MG 24 hr tablet Take 50 mg by mouth 3 (three) times daily.    OLANZapine (ZYPREXA) 2.5 MG tablet Take 2.5 mg by mouth every evening.    oxybutynin (DITROPAN) 5 MG Tab Take 5 mg by mouth 2 (two) times daily.    sertraline (ZOLOFT) 100 MG tablet Take 100 mg by mouth once daily.    solifenacin (VESICARE) 10 MG tablet Take 10 mg by mouth once daily.    tamsulosin (FLOMAX) 0.4 mg Cap Take 0.4 mg by mouth once daily.    traZODone (DESYREL) 150 MG tablet Take 150 mg by mouth nightly.    bromfenac (PROLENSA) 0.07 % Drop Apply to eye 2 (two) times daily.    meloxicam (MOBIC) 7.5 MG tablet Take 7.5 mg by mouth once daily.    nitroGLYCERIN (NITROSTAT) 0.4 MG SL tablet Place 0.4 mg under the tongue every 15 (fifteen) minutes as needed for Chest pain.    ondansetron (ZOFRAN) 4 MG tablet Take 4 mg by mouth every 8 (eight) hours.     Family History     Family history is unknown by patient.        Tobacco Use    Smoking status: Former Smoker    Smokeless tobacco: Never Used   Substance and Sexual Activity    Alcohol use: No    Drug use: No    Sexual activity: Not on file     Review of Systems   Constitutional: Positive for activity change and fatigue. Negative for appetite change, chills, diaphoresis and fever.   HENT: Negative for  trouble swallowing.    Eyes: Negative for visual disturbance.   Respiratory: Positive for cough (non productive). Negative for shortness of breath and wheezing.    Cardiovascular: Negative for chest pain, palpitations and leg swelling.   Gastrointestinal: Negative for abdominal distention, abdominal pain, blood in stool, constipation, diarrhea, nausea and vomiting.   Genitourinary: Positive for difficulty urinating. Negative for hematuria, penile swelling and scrotal swelling.        Chronic villa that is changed by his friend.    Musculoskeletal: Positive for gait problem. Negative for arthralgias.   Skin: Negative for color change.   Neurological: Positive for weakness. Negative for dizziness, syncope, speech difficulty, light-headedness and headaches.   Hematological: Does not bruise/bleed easily.   Psychiatric/Behavioral: Negative for agitation, confusion and hallucinations. The patient is not nervous/anxious.      Objective:     Vital Signs (Most Recent):  Temp: 98.2 °F (36.8 °C) (12/03/19 1105)  Pulse: (!) 52 (12/03/19 1302)  Resp: 19 (12/03/19 1302)  BP: (!) 199/82 (12/03/19 1302)  SpO2: 100 % (12/03/19 1302) Vital Signs (24h Range):  Temp:  [98.2 °F (36.8 °C)] 98.2 °F (36.8 °C)  Pulse:  [49-54] 52  Resp:  [12-35] 19  SpO2:  [99 %-100 %] 100 %  BP: (159-199)/(70-82) 199/82     Weight: 81.6 kg (180 lb)  Body mass index is 25.83 kg/m².    Physical Exam   Constitutional: He is oriented to person, place, and time. He has a sickly appearance. No distress.   HENT:   Head: Normocephalic and atraumatic.   Eyes: Pupils are equal, round, and reactive to light.   Neck: Normal range of motion. No JVD present.   Cardiovascular: Normal rate, regular rhythm and intact distal pulses.   No murmur heard.  Pulmonary/Chest: Effort normal and breath sounds normal. No respiratory distress. He has no wheezes.   Abdominal: Soft. Bowel sounds are normal. He exhibits no distension. There is no tenderness. There is no guarding.    Genitourinary:   Genitourinary Comments: Chronic villa catheter   Musculoskeletal: He exhibits no edema or tenderness.   Neurological: He is alert and oriented to person, place, and time.   Skin: Skin is warm and dry. Capillary refill takes less than 2 seconds. He is not diaphoretic.   Psychiatric: He has a normal mood and affect. His behavior is normal. Judgment and thought content normal.   Nursing note and vitals reviewed.        CRANIAL NERVES     CN III, IV, VI   Pupils are equal, round, and reactive to light.       Significant Labs:   BMP:   Recent Labs   Lab 12/03/19  1127   *      K 4.5      CO2 25   BUN 15   CREATININE 0.9   CALCIUM 10.2   MG 1.9     CBC:   Recent Labs   Lab 12/03/19  1127   WBC 8.70   HGB 11.4*   HCT 36.0*        Magnesium:   Recent Labs   Lab 12/03/19  1127   MG 1.9     Troponin:   Recent Labs   Lab 12/03/19  1127   TROPONINI 0.073*     TSH:   Recent Labs   Lab 12/03/19  1127   TSH 0.300*     Urine Culture: No results for input(s): LABURIN in the last 48 hours.  Urine Studies:   Recent Labs   Lab 12/03/19  1152   COLORU Yellow   APPEARANCEUA Hazy*   PHUR 7.0   SPECGRAV 1.020   PROTEINUA Trace*   GLUCUA Negative   KETONESU Negative   BILIRUBINUA Negative   OCCULTUA Trace*   NITRITE Positive*   UROBILINOGEN Negative   LEUKOCYTESUR 3+*   RBCUA 1   WBCUA 40*   BACTERIA Few*   HYALINECASTS 1       Significant Imaging: I have reviewed all pertinent imaging results/findings within the past 24 hours.    Assessment/Plan:     * Falls frequently  Debility    Consulting PT/OT for recommendations. I am also consulting  for nursing home placement. He uses a walker at home.     Type 2 diabetes mellitus without complication, without long-term current use of insulin  Hold oral medications Metformin. Check hemoglobin A1c. Diabetic and cardiac diet ordered. Check BG 4 times daily. We will cover with prn low dose insulin aspart.       Chronic indwelling Villa  catheter  Urinary retention  Acute cystitis without hematuria    Will have the nursing staff change the villa as he has a UTI. Cont Ceftriaxone given in the ED. Monitor. Will also cont Flomax 0.4 mg PO daily.     Elevated troponin  Trend troponin. likely elevated in response to UTI. Monitor on cardiac tele.       Hypertension  Resume home medications. He takes Metoprolol 50 mg PO BID and he takes Hydralazine 50 mg TID. Monitor.         VTE Risk Mitigation (From admission, onward)         Ordered     enoxaparin injection 40 mg  Daily      12/03/19 1359     IP VTE HIGH RISK PATIENT  Once      12/03/19 1359                   Lane Palacios NP  Department of Hospital Medicine   [facility]

## 2019-12-03 NOTE — PLAN OF CARE
VN cued into pt's room for introduction. VN informed pt that VN would be working along side bedside nurse and PCT throughout shift. Level of present pain assessed. At present no distress noted. Thoroughly discussed with patient plan of care. Also completed admission assessment data with cooperation of patient. Discussed with patient High fall risk protocol and interventions that have been initiated and cont be in place for safety. Patient verbalized clear understanding and cooperation using teach back method. Bed alarm presently activated and in use. Will cont to be available to patient and intervene prn.

## 2019-12-03 NOTE — ASSESSMENT & PLAN NOTE
Debility    Consulting PT/OT for recommendations. I am also consulting  for nursing home placement. He uses a walker at home.

## 2019-12-03 NOTE — ED PROVIDER NOTES
"Encounter Date: 12/3/2019    SCRIBE #1 NOTE: I, Dorian Linnette, am scribing for, and in the presence of, SHONDA Carlos.       History     Chief Complaint   Patient presents with    Fall     pt and his friend were brought here by a friend to try to get admitted to [facility]. Friend was told it would be easiest to get admitted there from the hospital. Reports frequent falls at home. Denies pain.      Patient is a 74 year-old male with history of DM, urinary retention requiring Villa catheter sine August 2017,  presents to the ED for wellness evaluation. Patient was referred to the ER for labs and a chest x-ray to be cleared for admission to [facility]. He was told the ER would be the easiest way to get him admitted to their facility. Patient also complains of numerous falls recently because his legs "just give out" and almost had another fall today as well. Patient states he uses a walker to get around "anywhere further than a few feet." He denies any pain currently, headache, dizziness or LOC but does mention he is sensitive to coldness. The patient's friend, who is also a patient in the adjacent ER bay, reports the patient has suspected early onset of Dementia. She states he gets confused and will not remember things as well as he used to. Patient reports he currently has a villa catheter in which was placed about a month or so ago, since "the last time I was in the hospital" at [facility]. Patient states he is unable to bathe himself or get around as much as he would like to and is requesting to be placed in a nursing home. Patient reports history of DM and is currently on Metformin.    The history is provided by the patient.     Review of patient's allergies indicates:  No Known Allergies  Past Medical History:   Diagnosis Date    Diabetes mellitus     Hypertension     Urinary retention      History reviewed. No pertinent surgical history.  Family History   Family history unknown: Yes "     Social History     Tobacco Use    Smoking status: Former Smoker    Smokeless tobacco: Never Used   Substance Use Topics    Alcohol use: No    Drug use: No     Review of Systems   Constitutional: Positive for chills. Negative for fever.        + generalized weakness   HENT: Negative for sore throat.    Respiratory: Negative for shortness of breath.    Cardiovascular: Negative for chest pain.   Gastrointestinal: Negative for abdominal pain and nausea.   Genitourinary: Negative for dysuria.   Musculoskeletal: Negative for back pain and neck pain.   Skin: Negative for rash.   Neurological: Negative for dizziness, syncope, weakness and headaches.   Hematological: Does not bruise/bleed easily.   All other systems reviewed and are negative.      Physical Exam     Initial Vitals [12/03/19 1105]   BP Pulse Resp Temp SpO2   (!) 190/79 (!) 49 (!) 21 98.2 °F (36.8 °C) 100 %      MAP       --         Physical Exam    Nursing note and vitals reviewed.  Constitutional: He appears well-developed and well-nourished. No distress.   HENT:   Head: Normocephalic and atraumatic.   No signs of trauma   Eyes: EOM are normal. Pupils are equal, round, and reactive to light.   Neck: Normal range of motion. Neck supple.   Cardiovascular: Normal heart sounds and intact distal pulses. Bradycardia present.    No murmur heard.  Pulmonary/Chest: Breath sounds normal. No respiratory distress. He has no wheezes.   Abdominal: Soft. He exhibits no distension. There is no tenderness.   Musculoskeletal: Normal range of motion. He exhibits no edema.   No bony tenderness or decreased ROM  No LE edema   Neurological: He is alert. He has normal strength. No sensory deficit. GCS score is 15. GCS eye subscore is 4. GCS verbal subscore is 5. GCS motor subscore is 6.   Patient is oriented to person and place but states today is December 3, 2012.   Strength and sensation equal and intact to all 4 extremities   Skin: Skin is warm and dry.         ED Course    Procedures  Labs Reviewed   URINALYSIS, REFLEX TO URINE CULTURE - Abnormal; Notable for the following components:       Result Value    Appearance, UA Hazy (*)     Protein, UA Trace (*)     Occult Blood UA Trace (*)     Nitrite, UA Positive (*)     Leukocytes, UA 3+ (*)     All other components within normal limits    Narrative:     Preferred Collection Type->Urine, Clean Catch   CBC W/ AUTO DIFFERENTIAL - Abnormal; Notable for the following components:    RBC 4.28 (*)     Hemoglobin 11.4 (*)     Hematocrit 36.0 (*)     Mean Corpuscular Hemoglobin 26.6 (*)     Mean Corpuscular Hemoglobin Conc 31.7 (*)     RDW 15.7 (*)     MPV 8.9 (*)     Lymph # 0.7 (*)     Gran% 80.8 (*)     Lymph% 8.0 (*)     All other components within normal limits   COMPREHENSIVE METABOLIC PANEL - Abnormal; Notable for the following components:    Glucose 112 (*)     ALT 9 (*)     All other components within normal limits   TROPONIN I - Abnormal; Notable for the following components:    Troponin I 0.073 (*)     All other components within normal limits   TSH - Abnormal; Notable for the following components:    TSH 0.300 (*)     All other components within normal limits   URINALYSIS MICROSCOPIC - Abnormal; Notable for the following components:    WBC, UA 40 (*)     WBC Clumps, UA Few (*)     Bacteria Few (*)     All other components within normal limits    Narrative:     Preferred Collection Type->Urine, Clean Catch   CULTURE, URINE   MAGNESIUM   TROPONIN I   TSH   MAGNESIUM   T4, FREE   POCT GLUCOSE   POCT GLUCOSE MONITORING CONTINUOUS   POCT GLUCOSE MONITORING CONTINUOUS        ECG Results          EKG 12-lead (Final result)  Result time 12/04/19 08:57:26    Final result by Interface, Lab In [facility] (12/04/19 08:57:26)                 Narrative:    Test Reason : R53.1,    Vent. Rate : 053 BPM     Atrial Rate : 053 BPM     P-R Int : 208 ms          QRS Dur : 126 ms      QT Int : 512 ms       P-R-T Axes : -07 -04 268 degrees     QTc Int : 480  ms    Sinus bradycardia  LVH with QRS widening and repolarization abnormality  Abnormal ECG  No previous ECGs available  Confirmed by Florentin Mitchell MD ([id number]) on 12/4/2019 8:57:16 AM    Referred By: [email or web address]   SELF           Confirmed By:Florentin Mitchell MD                            Imaging Results          X-Ray Chest PA And Lateral (Final result)  Result time 12/03/19 12:11:30    Final result by Brodie Álvarez MD (12/03/19 12:11:30)                 Impression:      No radiographic acute intrathoracic process seen.      Electronically signed by: Brodie Álvarez MD  Date:    12/03/2019  Time:    12:11             Narrative:    EXAMINATION:  XR CHEST PA AND LATERAL    CLINICAL HISTORY:  weakness;    TECHNIQUE:  PA and lateral views of the chest were performed.    COMPARISON:  Lumbar spine series 12/27/2007    FINDINGS:  Cardiac silhouette is upper limits of normal in size without evidence of failure.  There is calcific atherosclerosis and mild tortuosity of the thoracic aorta.  Sternotomy wires in place and appear intact.  Few scattered linear opacities at the lung bases consistent with subsegmental scarring versus atelectasis.  The lungs are otherwise symmetrically well expanded without focal consolidation, pleural effusion or pneumothorax.  Pulmonary vasculature and hilar regions are within normal limits.  No acute osseous process seen.                                 Medical Decision Making:   Clinical Tests:   Lab Tests: Reviewed and Ordered  Radiological Study: Reviewed and Ordered  Medical Tests: Reviewed and Ordered       APC / Resident Notes:   Patient presents to the ER with a friend due to frequent falls at home and request for nursing home placement.  Patient reports that when he falls his legs give out on him.  There is no reported injury during his most recent fall 2 days ago.  Denies head injury, LOC, new neck, back or joint pain.  There is no evidence of trauma on exam.  Patient has  indwelling catheter.  Will obtain blood work, chest x-ray, and UA for further evaluation of generalized weakness.  UA significant for positive nitrites and 40 WBC.  Will treat for UTI with Rocephin.  Patient's friend states that he has signs of dementia over the last several months, but denies recent altered mental status.  His presentation is not concerning for sepsis, he is afebrile, no leukocytosis.  Patient's troponin is elevated to .073.  He denies any chest pain today. BP is elevated, he reports that he took his antihypertensive medications today.      Chest x-ray is negative for acute or infectious process.  EKG shows sinus bradycardia, rate of 53, LVH without evidence of acute ischemia.    I Discussed care this patient my supervising physician.  I have discussed patient's presentation and admission with Dr. Caicedo, she has agreed to admit the patient for further treatment of UTI, ACS rule out with repeat troponins, and evaluation for nursing home placement.  Patient is comfortable with plan for admission.         Attending Attestation:     Physician Attestation Statement for NP/PA:   I have conducted a face to face encounter with this patient in addition to the NP/PA, due to NP/PA Request    Other NP/PA Attestation Additions:    History of Present Illness: Patient is a 74-year-old male with past medical history of diabetes and hypertension who was brought in for frequent falls and generalized weakness. His friend and caretaker states that she can no longer assist him at home.     Physical Exam: Patient is hypertensive and bradycardic.  Afebrile.  Nontoxic appearing.  A&Ox4, Regular rhythm, no murmur.  Lungs CTAB.  Abd is soft and nontender.     Medical Decision Making: ED workup reveals a urinary tract infection and an elevated troponin.  There are also lateral and inferior T-wave and ST segment changes on an EKG.   Patient will be admitted for further management of generalized weakness, UTI, elevated  troponin and EKG changes.                      ED Course as of Dec 04 1009   Tue Dec 03, 2019   1340 BP(!): 190/79 [LD]   1340 Temp: 98.2 °F (36.8 °C) [LD]   1340 Pulse(!): 49 [LD]   1340 Resp(!): 21 [LD]   1340 SpO2: 100 % [LD]   1340 Troponin I(!): 0.073 [LD]   1340 WBC, UA(!): 40 [LD]   1340 WBC Clumps, UA(!): Few [LD]   1340 Bacteria, UA(!): Few [LD]   1340 NITRITE UA(!): Positive [LD]   1340 Leukocytes, UA(!): 3+ [LD]   1353 I discussed patients presentation with Dr. Villa.  She has agreed to admit the patient and she will place admission orders. Patient is being admitted with elevated troponin to trend troponin, UTI, frequent falls and need for nursing home placement.      [LH]   1505 Patient is a 74-year-old male with past medical history of diabetes and hypertension who was brought in for frequent falls and generalized weakness. His friend and caretaker states that she can no longer assist him at home.  Patient is hypertensive and bradycardic.  Afebrile.  Nontoxic appearing.  A&Ox4, Regular rhythm, no murmur.  Lungs CTAB.  Abd is soft and nontender.  ED workup reveals a urinary tract infection and an elevated troponin.  There are also lateral and inferior T-wave and ST segment changes on an EKG.   Patient will be admitted for further management.    [LD]   1513 EKG with sinus bradycardia, rate of 53 bpm.  ST and T wave abnormality laterally.  + LVH.  No STEMI    [LD]      ED Course User Index  [LD] Dawna Dixon MD  [LH] SHONDA Eubanks                Clinical Impression:       ICD-10-CM ICD-9-CM   1. Urinary tract infection associated with indwelling urethral catheter, initial encounter T83.511A 996.64    N39.0 599.0   2. Bradycardia R00.1 427.89   3. Generalized weakness R53.1 780.79   4. Falls frequently R29.6 V15.88   5. Elevated troponin R79.89 790.6           Disposition:   Disposition: Placed in Observation  Condition: Stable      I, Dawna Bhardwaj, personally performed the services  described in this documentation. All medical record entries made by the scribe were at my direction and in my presence.  I have reviewed the chart and agree that the record reflects my personal performance and is accurate and complete. Dawna Bhardwaj, HARLAN.  12:48 PM 12/04/2019                 SHONDA Eubanks  12/03/19 1825       Dawna Dixon MD  12/04/19 1009

## 2019-12-04 NOTE — PT/OT/SLP EVAL
"Physical Therapy Evaluation and treatment    Patient Name:  Reddy Barron   MRN:  [id number]    Recommendations:     Discharge Recommendations:  nursing facility, skilled(with transition to skilled nursing)   Discharge Equipment Recommendations: (defer to SNF)   Barriers to discharge: Decreased caregiver support    Assessment:     Reddy Barron is a 74 y.o. male admitted with a medical diagnosis of Falls frequently.  He presents with the following impairments/functional limitations:  weakness, impaired endurance, gait instability, impaired balance, decreased lower extremity function, decreased ROM, impaired muscle length, impaired functional mobilty, impaired self care skills, decreased safety awareness .  Poor balance and overall weakness. Numerous falls in past month .   Unsafe to go home -will need skilled nursing placement bur recommend post acute with transition to skilled nursing    Rehab Prognosis: Fair; patient would benefit from acute skilled PT services to address these deficits and reach maximum level of function.    Recent Surgery: * No surgery found *      Plan:     During this hospitalization, patient to be seen 5 x/week to address the identified rehab impairments via gait training, therapeutic activities, therapeutic exercises and progress toward the following goals:    · Plan of Care Expires:  01/03/20    Subjective     Chief Complaint: I fall a lot.   Patient/Family Comments/goals: states 911 says he has called them 19 times in the past month for falls.  They had to break down the door cause I was on the floor and couldn't get up.  My legs just stop working .     Pain/Comfort:  · Pain Rating 1: 0/10  · Pain Rating Post-Intervention 1: 0/10    Patients cultural, spiritual, Faith conflicts given the current situation: no    Living Environment:  Pt is currently living with a "friend" (who currently is patient here as well) in 1st floor apt with 1 STACY.  Has tub shower combo and standard toilet.  PATIENT STATES FRIEND " MOVED IN  2 WEEKS AGO TO HELP HIM OUT.  Prior to that he was living alone  Prior to admission, patients level of function was mod I with rollator but numerous falls.  States friend is using his cane and she either walks in front or behind him. Reports mod I for dressing, washes up at sink.  Uses taxi to go to grocery and buys almond butter, cereal-items easy to eat. .  Equipment used at home: rollator.  DME owned (not currently used): standard walker and single point cane-which friend is using .  Upon discharge, patient does not  have assistance.  Friend who he lives with is in poor health and needs assist herself    Objective:     Communicated with nurse prior to session.  Patient found HOB elevated with villa catheter, bed alarm, peripheral IV  upon PT entry to room.    General Precautions: Standard, fall   Orthopedic Precautions:N/A   Braces: N/A     Exams:  · Cognitive Exam:  Patient is oriented to Person, Place, Time and Situation with delay in response  · Gross Motor Coordination:  Impaired   · Postural Exam:  Patient presented with the following abnormalities:    · -       Rounded shoulders  · -       Forward head  · -       Lateral weight shift of hips  · -       Abnormal trunk flexion  · -       Post and L lat lean with L side bend   · Sensation:    · -       Intact  light/touch BLE  · Skin Integrity/Edema:      · -       Skin integrity: Visible skin intact  · -       Edema: None noted BLE  · RLE ROM: Deficits: lacks terminal knee ext with tight hams gardenia in sitting otherwise WFL  · RLE Strength: Deficits: grossly in 3+/4- range  · LLE ROM: Deficits: same as RLE  · LLE Strength: Deficits: as on RLE    Functional Mobility:  · Bed Mobility:     · Supine to Sit: moderate assistance and HOB elevated and use of side rail  · Sit to Supine: moderate assistance and and side rail to flat bed   · Transfers:     · Sit to Stand:  moderate assistance with 4 wheeled walker with cues for hand placement.  1st trial trunk  side bent and rot to L with post and L lean. Small MG, 2nd trial more upright with tactile cues    · Gait: 2 steps forward and back with rollator and mod assist, narrow MG, small steps with step to gait, post lean .  4 side steps toward HOB before return to sit with cues to reach back   · Balance: poor sit and stand posture       Therapeutic Activities and Exercises:   PT janis.  Worked on posture and sit and standing balance with verbal, visual and tactile cues .  Gait as above.  repositioned in bed     AM-PAC 6 CLICK MOBILITY  Total Score:11     Patient left HOB elevated with all lines intact, call button in reach, bed alarm on and nurse notified.    GOALS:   Multidisciplinary Problems     Physical Therapy Goals        Problem: Physical Therapy Goal    Goal Priority Disciplines Outcome Goal Variances Interventions   Physical Therapy Goal     PT, PT/OT Ongoing, Progressing     Description:    Goals to be met by: 19     Patient will increase functional independence with mobility by performin. Supine to sit with MInimal Assistance  2. Sit to supine with MInimal Assistance  3. Sit to stand transfer with Minimal Assistance  4. Sit EOB 20 min with SBA   5.  Gait  x 25 feet with Minimal Assistance using Rolling Walker.                       History:     Past Medical History:   Diagnosis Date    Diabetes mellitus     Hypertension     Urinary retention        History reviewed. No pertinent surgical history.    Time Tracking:     PT Received On: 19  PT Start Time: 1627     PT Stop Time: 1657  PT Total Time (min): 30 min     Billable Minutes: Evaluation 15 and Therapeutic Activity 15      Aminata Craig, PT  2019

## 2019-12-04 NOTE — ED NOTES
APPEARANCE: Alert, oriented and in no acute distress.  CARDIAC: Normal rate and rhythm, no murmur heard.   PERIPHERAL VASCULAR: peripheral pulses present. Normal cap refill. No edema. Warm to touch.    RESPIRATORY:Normal rate and effort, breath sounds clear bilaterally throughout chest. Respirations are equal and unlabored no obvious signs of distress.  GASTRO: soft, bowel sounds normal, no tenderness, no abdominal distention.  SKIN: Skin is warm and dry, normal skin turgor, mucous membranes moist.  NEURO: 5/5 strength major flexors/extensors bilaterally. Sensory intact to light touch bilaterally. [place] coma scale: eyes open spontaneously-4, oriented & converses-5, obeys commands-6. No neurological abnormalities.   MENTAL STATUS: awake, alert and aware of environment.  EYE: PERRL, both eyes: pupils brisk and reactive to light. Normal size.  ENT: EARS: no obvious drainage. NOSE: no active bleeding.

## 2019-12-04 NOTE — PROGRESS NOTES
"[facility] Hospital Medicine  Progress Note    Patient Name: Reddy Barron  MRN: [id number]  Patient Class: OP- Observation   Admission Date: 12/3/2019  Length of Stay: 0 days  Attending Physician: Aline Singer*  Primary Care Provider: Edgar Fang MD        Subjective:     Principal Problem:Falls frequently        HPI:  Mr. Reddy Barron is a 73 y/o male who lives in [place], [place] at his residence. His PCP is Dr. Edgar Fang. He has a past medical history of hypertension, urinary retention, chronic indwelling villa, and diabetes. No documented surgeries noted. He denies smoking cigarettes, drinking alcohol, and he denies illicit drug use.    He presents to [facility] with complaints of frequent falls. The patient states he has had numerous falls recently because his legs "just give out" and almost had another fall today as well. Patient states he uses a walker to get around "anywhere further than a few feet." He denies any pain currently, headache, dizziness or LOC but does mention he is sensitive to coldness. The patient states he currently has a villa catheter in which was placed about a month or so ago, since he was in the hospital at [facility]. Patient states he is unable to bathe himself or get around as much as he would like to and is requesting to be placed in a nursing home.     The ED workup notes nothing acute on CXR. The labs note a stable hemoglobin, elevated troponin, and UTI as confirmed on U/A. Will admit to the [facility] department for further care.     Overview/Hospital Course:  He was admitted to the hospital medicine service for care. Ordered PT/OT. Consulted SS to assist with placement.     Interval History: Sweet little man, he is worried about his home. He states the EMS had to break down his door to get to him as he was on the floor. He slept well overnight. No complaints at this time.     Review of Systems "   Constitutional: Negative for activity change, appetite change, fatigue and fever.   HENT: Negative for trouble swallowing.    Eyes: Negative for visual disturbance.   Respiratory: Negative for shortness of breath.    Cardiovascular: Negative for chest pain, palpitations and leg swelling.   Gastrointestinal: Negative for abdominal distention, abdominal pain, diarrhea, nausea and vomiting.   Genitourinary: Negative for difficulty urinating and hematuria.   Musculoskeletal: Positive for gait problem.   Skin: Negative for color change and wound.   Hematological: Does not bruise/bleed easily.   Psychiatric/Behavioral: Negative for agitation and confusion. The patient is not nervous/anxious.      Objective:     Vital Signs (Most Recent):  Temp: 97.9 °F (36.6 °C) (12/04/19 0752)  Pulse: (!) 49 (12/04/19 1152)  Resp: 19 (12/04/19 0752)  BP: (!) 183/78 (12/04/19 0752)  SpO2: 97 % (12/04/19 0752) Vital Signs (24h Range):  Temp:  [96.8 °F (36 °C)-98.3 °F (36.8 °C)] 97.9 °F (36.6 °C)  Pulse:  [48-57] 49  Resp:  [18-20] 19  SpO2:  [93 %-97 %] 97 %  BP: (168-187)/(73-83) 183/78     Weight: 64.8 kg (142 lb 13.7 oz)  Body mass index is 20.5 kg/m².    Intake/Output Summary (Last 24 hours) at 12/4/2019 1548  Last data filed at 12/4/2019 0800  Gross per 24 hour   Intake 375 ml   Output 2400 ml   Net -2025 ml      Physical Exam   Constitutional: He is oriented to person, place, and time. He appears ill.   HENT:   Head: Normocephalic and atraumatic.   Eyes: Pupils are equal, round, and reactive to light.   Neck: Normal range of motion.   Cardiovascular: Normal rate and intact distal pulses.   No murmur heard.  Pulmonary/Chest: Effort normal and breath sounds normal.   Abdominal: Soft. Bowel sounds are normal.   Musculoskeletal: Normal range of motion.   Neurological: He is alert and oriented to person, place, and time.   Skin: Skin is warm and dry. Capillary refill takes less than 2 seconds.   Psychiatric: He has a normal mood and  affect. His behavior is normal. Judgment and thought content normal.   Nursing note and vitals reviewed.      Significant Labs:   BMP:   Recent Labs   Lab 12/04/19  0558   GLU 91      K 3.4*      CO2 26   BUN 9   CREATININE 0.7   CALCIUM 9.4   MG 2.1     CBC:   Recent Labs   Lab 12/03/19  1127 12/04/19  0557   WBC 8.70 7.97   HGB 11.4* 10.9*   HCT 36.0* 34.8*    338     Magnesium:   Recent Labs   Lab 12/03/19  1127 12/04/19  0558   MG 1.9 2.1     POCT Glucose:   Recent Labs   Lab 12/03/19  1736 12/03/19  2201 12/04/19  0532   POCTGLUCOSE 108 91 97       Significant Imaging: I have reviewed all pertinent imaging results/findings within the past 24 hours.      Assessment/Plan:      * Falls frequently  Debility    Consulting PT/OT for recommendations. I am also consulting  for nursing home placement. He uses a walker at home.     Type 2 diabetes mellitus without complication, without long-term current use of insulin  Hold oral medications Metformin. Check hemoglobin A1c. Diabetic and cardiac diet ordered. Check BG 4 times daily. We will cover with prn low dose insulin aspart.       Chronic indwelling Sousa catheter  Urinary retention  Acute cystitis without hematuria    Sousa catheter has been changed by the nursing staff.  Cont Ceftriaxone and monitor. Will also cont Flomax 0.4 mg PO daily.       Elevated troponin  Trend troponin. Likely elevated in response to UTI. Monitor on cardiac tele.       Hypertension  Resume home medications. He takes Metoprolol 50 mg PO BID and he takes Hydralazine 50 mg TID. Monitor.         VTE Risk Mitigation (From admission, onward)         Ordered     enoxaparin injection 40 mg  Daily      12/03/19 1359     IP VTE HIGH RISK PATIENT  Once      12/03/19 1359                      Lane Palacios NP  Department of Hospital Medicine   [facility]

## 2019-12-04 NOTE — PLAN OF CARE
PT eval.  Poor balance and overall weakness. Numerous falls in past month .   Unsafe to go home -will need senior care placement   REC:  SNF with transition to senior care  DME:  defer to SNF

## 2019-12-04 NOTE — HOSPITAL COURSE
He was admitted to the hospital medicine service for care. Ordered PT/OT---recommending SNF. Consulted SS to assist with placement. Will d/c to the nursing home on today. He is medically ready.

## 2019-12-04 NOTE — PLAN OF CARE
Living Will  During this visit, I engaged the patient  in the advance care planning process.  The patient and I reviewed the role for advance directives and their purpose in directing future healthcare if the patient's unable to speak for him/herself.  At this point in time, the patient does have full decision-making capacity.  We discussed different extreme health states that he could experience, and reviewed what kind of medical care he would want in those situations. Patient states that he wants to look over the information and think about it over night. Patient consents to having the [facility] , Lulu, come and visit with him to discuss ACP further. Consult placed to Spiritual Care. Will cont to be available and intervene prn.

## 2019-12-04 NOTE — ASSESSMENT & PLAN NOTE
Urinary retention  Acute cystitis without hematuria    Sousa catheter has been changed by the nursing staff.  Cont Ceftriaxone and monitor. Will also cont Flomax 0.4 mg PO daily.

## 2019-12-04 NOTE — PLAN OF CARE
sent referral to [facility] for NH placement. SW will continue to follow pending acceptance.       12/04/19 1137   Post-Acute Status   Post-Acute Authorization Placement   Post-Acute Placement Status Referrals Sent

## 2019-12-04 NOTE — SUBJECTIVE & OBJECTIVE
Interval History: Sweet little man, he is worried about his home. He states the EMS had to break down his door to get to him as he was on the floor. He slept well overnight. No complaints at this time.     Review of Systems   Constitutional: Negative for activity change, appetite change, fatigue and fever.   HENT: Negative for trouble swallowing.    Eyes: Negative for visual disturbance.   Respiratory: Negative for shortness of breath.    Cardiovascular: Negative for chest pain, palpitations and leg swelling.   Gastrointestinal: Negative for abdominal distention, abdominal pain, diarrhea, nausea and vomiting.   Genitourinary: Negative for difficulty urinating and hematuria.   Musculoskeletal: Positive for gait problem.   Skin: Negative for color change and wound.   Hematological: Does not bruise/bleed easily.   Psychiatric/Behavioral: Negative for agitation and confusion. The patient is not nervous/anxious.      Objective:     Vital Signs (Most Recent):  Temp: 97.9 °F (36.6 °C) (12/04/19 0752)  Pulse: (!) 49 (12/04/19 1152)  Resp: 19 (12/04/19 0752)  BP: (!) 183/78 (12/04/19 0752)  SpO2: 97 % (12/04/19 0752) Vital Signs (24h Range):  Temp:  [96.8 °F (36 °C)-98.3 °F (36.8 °C)] 97.9 °F (36.6 °C)  Pulse:  [48-57] 49  Resp:  [18-20] 19  SpO2:  [93 %-97 %] 97 %  BP: (168-187)/(73-83) 183/78     Weight: 64.8 kg (142 lb 13.7 oz)  Body mass index is 20.5 kg/m².    Intake/Output Summary (Last 24 hours) at 12/4/2019 1548  Last data filed at 12/4/2019 0800  Gross per 24 hour   Intake 375 ml   Output 2400 ml   Net -2025 ml      Physical Exam   Constitutional: He is oriented to person, place, and time. He appears ill.   HENT:   Head: Normocephalic and atraumatic.   Eyes: Pupils are equal, round, and reactive to light.   Neck: Normal range of motion.   Cardiovascular: Normal rate and intact distal pulses.   No murmur heard.  Pulmonary/Chest: Effort normal and breath sounds normal.   Abdominal: Soft. Bowel sounds are normal.    Musculoskeletal: Normal range of motion.   Neurological: He is alert and oriented to person, place, and time.   Skin: Skin is warm and dry. Capillary refill takes less than 2 seconds.   Psychiatric: He has a normal mood and affect. His behavior is normal. Judgment and thought content normal.   Nursing note and vitals reviewed.      Significant Labs:   BMP:   Recent Labs   Lab 12/04/19  0558   GLU 91      K 3.4*      CO2 26   BUN 9   CREATININE 0.7   CALCIUM 9.4   MG 2.1     CBC:   Recent Labs   Lab 12/03/19  1127 12/04/19  0557   WBC 8.70 7.97   HGB 11.4* 10.9*   HCT 36.0* 34.8*    338     Magnesium:   Recent Labs   Lab 12/03/19  1127 12/04/19  0558   MG 1.9 2.1     POCT Glucose:   Recent Labs   Lab 12/03/19  1736 12/03/19  2201 12/04/19  0532   POCTGLUCOSE 108 91 97       Significant Imaging: I have reviewed all pertinent imaging results/findings within the past 24 hours.

## 2019-12-04 NOTE — PLAN OF CARE
HERI Younger called me and says she is currently working on pt's nursing home pt.  She states she started he and significant other's placement yesterday when seen in the ER.     12/04/19 1124   Post-Acute Status   Post-Acute Authorization Placement  (nursing home placement)     Ministerio Cooper RN,   [phone]

## 2019-12-05 NOTE — PLAN OF CARE
spoke with Tamra with N, [phone], patient is ambulating above 100 ft and will not be approved for SNF care. Patient will be considered for NH placement. Bella devries [facility], [phone], will visit with patient tomorrow morning to sign paperwork. SW will continue to follow, anticipated D/C tomorrow.       12/05/19 9707   Post-Acute Status   Post-Acute Authorization Placement   Post-Acute Placement Status Patient Evaluation by Facility

## 2019-12-05 NOTE — ASSESSMENT & PLAN NOTE
Debility    Consulting PT/OT for recommendations---SNF. I am also consulting  for nursing home placement. He uses a walker at home.

## 2019-12-05 NOTE — PLAN OF CARE
Problem: Occupational Therapy Goal  Goal: Occupational Therapy Goal  Description  Goals to be met by: 01/05     Patient will increase functional independence with ADLs by performing:    LE Dressing with Minimal Assistance.  Grooming while standing with Stand-by Assistance.  Toileting from toilet with Stand-by Assistance for hygiene and clothing management.   Toilet transfer to toilet with Contact Guard Assistance.  Increased functional strength to WFL for ADLs.     Outcome: Ongoing, Progressing   Pt found in supine & agreeable to OT eval/tx this AM. Pt lives w/ friend in 1st fl apt w/ 1STE; t/s. PLOF: MI via RW w/ all fxnl tasks incl standing tub showers, however pt has had numerous falls over the past month & states that he is no longer able to care for himself.   Currently, pt AO4 & perf the following: sup-->EOB w/ Mod A w/ vc's for re-direction 2/2 decreased attn to task, jaye'd sitting EOB ~5min w/ SBA & donned gown w/ Mod A, standing via RW w/ CGA & amb x 3 steps w/ CGA, t/f via RW-->b/s chair w/ CGA & mult vc's for safety & proper UE placement. Edu/tx re: general safety techs & HEP. Pt verbalized understanding.  **Pt fully oriented, but demo'ed tangential, off topic speech w/ inability to accurately relate/sequence living situation hx (I.e--apt in [place] & move from [place])    Pt presents w/ decreased overall endurance/conditioning, balance/mobility & coordination/cognition w/ subsequent decline in (I)/safety w/ BADLs, fxnl mobility & fxnl t/f's.  OT 5x/wk to increase phys/fxnl status & maximize potential to achieve established goals for d/c-->SNF w/ transition to half-way care & rec RW.

## 2019-12-05 NOTE — PLAN OF CARE
Plan of care reviewed with patient. Normal sinus nando on continuous heart monitor, no true red alarms. Safety maintained at this time. Bed in lowest position, side rails up x 2. Call light in reach.  Encouraged patient to use call light for assistance .Verbalized understanding, supapubic cath in place draining clear yellow urine. Incontinent of bowel. Kept clean and dry per staff assist. Patient receiving IV antibiotics.No adverse reaction noted.Blood glucose maintained per MD orders.  Safety maintained. Will continue to monitor patient.

## 2019-12-05 NOTE — PT/OT/SLP PROGRESS
Physical Therapy Treatment    Patient Name:  Reddy Barron   MRN:  [id number]    Recommendations:     Discharge Recommendations:  nursing facility, skilled   Discharge Equipment Recommendations: none   Barriers to discharge: None    Assessment:     Reddy Barron is a 74 y.o. male admitted with a medical diagnosis of Falls frequently.  He presents with the following impairments/functional limitations:  impaired endurance, gait instability, impaired functional mobilty, impaired balance, decreased coordination, decreased lower extremity function, decreased safety awareness, impaired cognition, weakness, impaired coordination .Pt continues with functional mobility deficits and should benefit from continuing current PT POC to maximize I and safety decrease risk of further decline of injury.    Rehab Prognosis: Good; patient would benefit from acute skilled PT services to address these deficits and reach maximum level of function.    Recent Surgery: * No surgery found *      Plan:     During this hospitalization, patient to be seen 5 x/week to address the identified rehab impairments via gait training, therapeutic activities, therapeutic exercises, neuromuscular re-education and progress toward the following goals:    · Plan of Care Expires:  01/03/20    Subjective     Chief Complaint: pt without c/o  Patient/Family Comments/goals: pt asking if PT was d/cing pt from hospital, explained to pt only his MD could do that.  Pain/Comfort:  Pain Rating 1: 0/10      Objective:     Communicated with nurse prior to session.  Patient found standing at EOB with PCT with villa catheter, bed alarm, peripheral IV upon PT entry to room.     General Precautions: Standard, fall   Orthopedic Precautions:N/A   Braces: N/A     Functional Mobility:  · Bed Mobility:     · Supine to Sit: min A  · Transfers:     · Sit to Stand:  CGA with rolling walker  · Bed to Chair: CGA with  rolling walker  using  Step Transfer  Gait:  Gait training x 125ft  with with CGA, instruction for posture and RW management    AM-PAC 6 CLICK MOBILITY  Turning over in bed (including adjusting bedclothes, sheets and blankets)?: 3  Sitting down on and standing up from a chair with arms (e.g., wheelchair, bedside commode, etc.): 3  Moving from lying on back to sitting on the side of the bed?: 3  Moving to and from a bed to a chair (including a wheelchair)?: 3  Need to walk in hospital room?: 3  Climbing 3-5 steps with a railing?: 2  Basic Mobility Total Score: 17       Therapeutic Activities and Exercises:   Pt presented standing at EOB with PCT.  Pt transferred to sit with CGA,to supine with SBA.  Pt agreeable to PT. Transfer training supine>sit with min A, instructions for sequencing and hand placement with good return demonstration.  Sit>stand to RW with CGA with cuing for safe technique.  Gait training x 125ft with CGA with RW, pt keeping RW to far forward and with flexed posture. I instruction for posture and RW management with fair return demonstration.  Pt transfer to sit EOB with SBA, to supine with SBA. Pt required mod A to move up in bed. Pt c/o fatigue and deferred there ex.        Multidisciplinary Problems     Physical Therapy Goals        Problem: Physical Therapy Goal    Goal Priority Disciplines Outcome Goal Variances Interventions   Physical Therapy Goal     PT, PT/OT Ongoing, Progressing     Description:    Goals to be met by: 12/14/19   Goals updated 12/05/2019  1. Supine to sit with CGA  2. Sit to supine with SBA  3. Sit to stand transfer to RW with SBA  4. Sit EOB 20 min with SBA   5.  Gait  X 150 feet with  SBA  using Rolling Walker.                             Time Tracking:     PT Received On: 12/05/19  PT Start Time: 1426     PT Stop Time: 1444  PT Total Time (min): 18 min     Billable Minutes: Gait Training 18    Treatment Type: Treatment  PT/PTA: PT     PTA Visit Number: 0     Harish Reina, PT  12/05/2019

## 2019-12-05 NOTE — PROGRESS NOTES
"[facility] Hospital Medicine  Progress Note    Patient Name: Reddy Barron  MRN: [id number]  Patient Class: OP- Observation   Admission Date: 12/3/2019  Length of Stay: 0 days  Attending Physician: Aline Singer*  Primary Care Provider: Edgar Fang MD        Subjective:     Principal Problem:Falls frequently        HPI:  Mr. Reddy Barron is a 73 y/o male who lives in [place], [place] at his residence. His PCP is Dr. Edgar Fang. He has a past medical history of hypertension, urinary retention, chronic indwelling villa, and diabetes. No documented surgeries noted. He denies smoking cigarettes, drinking alcohol, and he denies illicit drug use.    He presents to [facility] with complaints of frequent falls. The patient states he has had numerous falls recently because his legs "just give out" and almost had another fall today as well. Patient states he uses a walker to get around "anywhere further than a few feet." He denies any pain currently, headache, dizziness or LOC but does mention he is sensitive to coldness. The patient states he currently has a villa catheter in which was placed about a month or so ago, since he was in the hospital at [facility]. Patient states he is unable to bathe himself or get around as much as he would like to and is requesting to be placed in a nursing home.     The ED workup notes nothing acute on CXR. The labs note a stable hemoglobin, elevated troponin, and UTI as confirmed on U/A. Will admit to the [facility] department for further care.     Overview/Hospital Course:  He was admitted to the hospital medicine service for care. Ordered PT/OT---recommending SNF. Consulted SS to assist with placement.     Interval History: No distress noted. No complaints, he states he slept well overnight.     Review of Systems   Constitutional: Negative for activity change, appetite change, chills and fatigue.   HENT: Negative for " trouble swallowing.    Eyes: Positive for redness. Negative for discharge.   Cardiovascular: Negative for chest pain.   Gastrointestinal: Negative for abdominal distention, abdominal pain, diarrhea, nausea and vomiting.   Genitourinary: Negative for difficulty urinating and hematuria.   Musculoskeletal: Positive for gait problem. Negative for arthralgias and myalgias.   Skin: Negative for color change and wound.   Neurological: Positive for weakness. Negative for dizziness, speech difficulty and light-headedness.   Hematological: Does not bruise/bleed easily.   Psychiatric/Behavioral: Negative for agitation and confusion. The patient is not nervous/anxious.      Objective:     Vital Signs (Most Recent):  Temp: 97.7 °F (36.5 °C) (12/05/19 1157)  Pulse: (!) 52 (12/05/19 1157)  Resp: 16 (12/05/19 1157)  BP: (!) 110/56 (12/05/19 1157)  SpO2: 97 % (12/05/19 1157) Vital Signs (24h Range):  Temp:  [96.7 °F (35.9 °C)-98.4 °F (36.9 °C)] 97.7 °F (36.5 °C)  Pulse:  [48-58] 52  Resp:  [16-19] 16  SpO2:  [95 %-98 %] 97 %  BP: (110-191)/(56-80) 110/56     Weight: 62.1 kg (136 lb 14.5 oz)  Body mass index is 19.64 kg/m².    Intake/Output Summary (Last 24 hours) at 12/5/2019 1324  Last data filed at 12/5/2019 0700  Gross per 24 hour   Intake --   Output 600 ml   Net -600 ml      Physical Exam   Constitutional: He is oriented to person, place, and time. He has a sickly appearance. No distress.   HENT:   Head: Normocephalic and atraumatic.   Eyes: Pupils are equal, round, and reactive to light.   Neck: Normal range of motion. No JVD present.   Cardiovascular: Normal rate and intact distal pulses.   No murmur heard.  Pulmonary/Chest: Effort normal and breath sounds normal. No respiratory distress. He has no wheezes.   Abdominal: Soft. Bowel sounds are normal. He exhibits no distension. There is no tenderness. There is no guarding.   Musculoskeletal: He exhibits no edema or tenderness.   Neurological: He is alert and oriented to  person, place, and time.   Skin: Skin is warm and dry. Capillary refill takes 2 to 3 seconds. He is not diaphoretic.   Psychiatric: He has a normal mood and affect. His behavior is normal. Judgment and thought content normal.   Nursing note and vitals reviewed.      Significant Labs:   BMP:   Recent Labs   Lab 12/05/19  0636         K 3.9      CO2 28   BUN 10   CREATININE 0.8   CALCIUM 9.7   MG 2.0     CBC:   Recent Labs   Lab 12/04/19  0557 12/05/19  0636   WBC 7.97 7.21   HGB 10.9* 10.9*   HCT 34.8* 35.0*    315     Magnesium:   Recent Labs   Lab 12/04/19  0558 12/05/19  0636   MG 2.1 2.0     POCT Glucose:   Recent Labs   Lab 12/04/19 2124 12/05/19 0625 12/05/19  1120   POCTGLUCOSE 101 110 170*       Significant Imaging: I have reviewed all pertinent imaging results/findings within the past 24 hours.      Assessment/Plan:      * Falls frequently  Debility    Consulting PT/OT for recommendations---SNF. I am also consulting  for nursing home placement. He uses a walker at home.     Type 2 diabetes mellitus without complication, without long-term current use of insulin  Hold oral medications Metformin. Check hemoglobin A1c. Diabetic and cardiac diet ordered. Check BG 4 times daily. We will cover with prn low dose insulin aspart.       Chronic indwelling Sousa catheter  Urinary retention  Acute cystitis without hematuria    Sousa catheter has been changed by the nursing staff.  Cont Ceftriaxone and monitor. Will also cont Flomax 0.4 mg PO daily.       Elevated troponin  Trend troponin. Likely elevated in response to UTI. Monitor on cardiac tele.       Hypertension  Resume home medications. He takes Metoprolol 50 mg PO BID and he takes Hydralazine 50 mg TID. Monitor.         VTE Risk Mitigation (From admission, onward)         Ordered     enoxaparin injection 40 mg  Daily      12/03/19 1359     IP VTE HIGH RISK PATIENT  Once      12/03/19 1359                      Lane HERNANDEZ  MONE Palacios  Department of Hospital Medicine   [facility]

## 2019-12-05 NOTE — PLAN OF CARE
Problem: Physical Therapy Goal  Goal: Physical Therapy Goal  Description    Goals to be met by: 19   Goals updated 2019  Patient will increase functional independence with mobility by performin. Supine to sit with CGA  2. Sit to supine with SBA  3. Sit to stand transfer to RW with SBA  4. Sit EOB 20 min with SBA   5.  Gait  X 150 feet with  SBA  using Rolling Walker.       Outcome: Ongoing, Progressing   Pt presented standing at EOB with PCT.  Pt transferred to sit with CGA,to supine with SBA.  Pt agreeable to PT. Transfer training supine>sit with min A, instructions for sequencing and hand placement with good return demonstration.  Sit>stand to RW with CGA with cuing for safe technique.  Gait training x 125ft with with SBA, instruction for posture and RW managementBed mobility supine>sit EOB with CGA at LE instruction for secrencing and hand placement with good return demonstration.  Sit> stand with SBA with good technique.  Gait training x 200ft with SBA with RW, pt keeping RW to far forward and with flexed posture instruction for posture and RW management with fair return demonstration.  Pt transfer to sit EOB with SBA, to supien with SBA. Pt required mod A to move up in bed. Pt c/o fatigue and deferred there ex.

## 2019-12-05 NOTE — PLAN OF CARE
VN rounds: VN cued into pt's room with pt's permission. Pt resting in bed. Fall risk protocol discussed with pt. VN instructed pt to call for assistance. Pt aware and agreeable. NAD noted. Allowed time for questions.  Will cont to be available and intervene as needed.     12/05/19 1200   Type of Frequent Check   Type Patient Rounds;Other (see comments)  (vn round)   Safety/Activity   Patient Rounds visualized patient;call light in patient/parent reach   Safety Promotion/Fall Prevention instructed to call staff for mobility;Fall Risk reviewed with patient/family   Activity Management up in chair   Pain/Comfort/Sleep   Preferred Pain Scale number (Numeric Rating Pain Scale)   Pain Rating (0-10): Rest 0   Sleep/Rest/Relaxation awake;no problem identified   Assessments (Pre/Post)   Level of Consciousness (AVPU) alert

## 2019-12-05 NOTE — PT/OT/SLP EVAL
Occupational Therapy   Evaluation/tx    Name: Reddy Barron  MRN: [id number]  Admitting Diagnosis:  Falls frequently      Recommendations:     Discharge Recommendations: nursing facility, basic, nursing facility, skilled  Discharge Equipment Recommendations:  walker, rolling  Barriers to discharge:  Decreased caregiver support    Assessment:    Pt presents w/ decreased overall endurance/conditioning, balance/mobility & coordination/cognition w/ subsequent decline in (I)/safety w/ BADLs, fxnl mobility & fxnl t/f's.  OT 5x/wk to increase phys/fxnl status & maximize potential to achieve established goals for d/c-->SNF w/ transition to intermediate care & rec RW.    Reddy Barron is a 74 y.o. male with a medical diagnosis of Falls frequently.  He presents with . Performance deficits affecting function: weakness, impaired endurance, impaired self care skills, impaired balance, gait instability, impaired functional mobilty, impaired cognition, decreased coordination, decreased safety awareness.      Rehab Prognosis: Good; patient would benefit from acute skilled OT services to address these deficits and reach maximum level of function.       Plan:     Patient to be seen 5 x/week to address the above listed problems via self-care/home management, therapeutic activities, therapeutic exercises  · Plan of Care Expires: 01/05/20  · Plan of Care Reviewed with: patient    Subjective     Chief Complaint: falls  Patient/Family Comments/goals: to stop falling    Occupational Profile:  Living Environment: w/ friend in 1st fl apt w/ 1STE; t/s  Previous level of function: MI via rollator  Roles and Routines: standing tub showers  Equipment Used at Home:  rollator  Assistance upon Discharge: will require 24/7 sup/assist    Pain/Comfort:  · Pain Rating 1: 0/10  · Pain Rating Post-Intervention 1: 0/10    Patients cultural, spiritual, Adventist conflicts given the current situation:      Objective:     Communicated with: petr prior to session.   Patient found HOB elevated with villa catheter, bed alarm, peripheral IV upon OT entry to room.    General Precautions: Standard, fall   Orthopedic Precautions:N/A   Braces: N/A     Occupational Performance:    Bed Mobility:    · Patient completed Supine to Sit with moderate assistance    Functional Mobility/Transfers:  · Patient completed Sit <> Stand Transfer with contact guard assistance  with  rolling walker   · Patient completed Bed <> Chair Transfer using Step Transfer technique with contact guard assistance with rolling walker  · Functional Mobility: via RW x 3 steps w/ CGA    Activities of Daily Living:  · Feeding:  supervision w/ HOB elevated  · Upper Body Dressing: moderate assistance don robe at EOB  · Lower Body Dressing: maximal assistance don socks    Cognitive/Visual Perceptual:  AO4  **confusion re: living situation hx  Impaired attn to task  Decreased STM    Physical Exam:  Sommer WFL 4/5prox; 4+/5 dist    Sit balance: F+  Stand balance: F- to F    AMPAC 6 Click ADL:  AMPAC Total Score: 15    Treatment & Education:   Pt found in supine & agreeable to OT eval/tx this AM. Pt lives w/ friend in 1st fl apt w/ 1STE; t/s. PLOF: MI via RW w/ all fxnl tasks incl standing tub showers, however pt has had numerous falls over the past month & states that he is no longer able to care for himself.   Currently, pt AO4 & perf the following: sup-->EOB w/ Mod A w/ vc's for re-direction 2/2 decreased attn to task, jaye'd sitting EOB ~5min w/ SBA & donned gown w/ Mod A, standing via RW w/ CGA & amb x 3 steps w/ CGA, t/f via RW-->b/s chair w/ CGA & mult vc's for safety & proper UE placement. Edu/tx re: general safety techs & HEP. Pt verbalized understanding.  **Pt fully oriented, but demo'ed tangential, off topic speech w/ inability to accurately relate/sequence living situation hx (I.e--apt in [place] & move from [place])    Patient left up in chair with all lines intact, call button in reach, chair alarm on and nsg  notified    GOALS:   Multidisciplinary Problems     Occupational Therapy Goals        Problem: Occupational Therapy Goal    Goal Priority Disciplines Outcome Interventions   Occupational Therapy Goal     OT, PT/OT Ongoing, Progressing    Description:  Goals to be met by: 01/05     Patient will increase functional independence with ADLs by performing:    LE Dressing with Minimal Assistance.  Grooming while standing with Stand-by Assistance.  Toileting from toilet with Stand-by Assistance for hygiene and clothing management.   Toilet transfer to toilet with Contact Guard Assistance.  Increased functional strength to WFL for ADLs.                      History:     Past Medical History:   Diagnosis Date    Diabetes mellitus     Hypertension     Urinary retention        History reviewed. No pertinent surgical history.    Time Tracking:     OT Date of Treatment: 12/05/19  OT Start Time: 1108  OT Stop Time: 1147  OT Total Time (min): 39 min    Billable Minutes:Evaluation 10  Therapeutic Activity 29  Total Time 39    THEODORA Ewing  12/5/2019

## 2019-12-05 NOTE — SUBJECTIVE & OBJECTIVE
Interval History: No distress noted. No complaints, he states he slept well overnight.     Review of Systems   Constitutional: Negative for activity change, appetite change, chills and fatigue.   HENT: Negative for trouble swallowing.    Eyes: Positive for redness. Negative for discharge.   Cardiovascular: Negative for chest pain.   Gastrointestinal: Negative for abdominal distention, abdominal pain, diarrhea, nausea and vomiting.   Genitourinary: Negative for difficulty urinating and hematuria.   Musculoskeletal: Positive for gait problem. Negative for arthralgias and myalgias.   Skin: Negative for color change and wound.   Neurological: Positive for weakness. Negative for dizziness, speech difficulty and light-headedness.   Hematological: Does not bruise/bleed easily.   Psychiatric/Behavioral: Negative for agitation and confusion. The patient is not nervous/anxious.      Objective:     Vital Signs (Most Recent):  Temp: 97.7 °F (36.5 °C) (12/05/19 1157)  Pulse: (!) 52 (12/05/19 1157)  Resp: 16 (12/05/19 1157)  BP: (!) 110/56 (12/05/19 1157)  SpO2: 97 % (12/05/19 1157) Vital Signs (24h Range):  Temp:  [96.7 °F (35.9 °C)-98.4 °F (36.9 °C)] 97.7 °F (36.5 °C)  Pulse:  [48-58] 52  Resp:  [16-19] 16  SpO2:  [95 %-98 %] 97 %  BP: (110-191)/(56-80) 110/56     Weight: 62.1 kg (136 lb 14.5 oz)  Body mass index is 19.64 kg/m².    Intake/Output Summary (Last 24 hours) at 12/5/2019 1324  Last data filed at 12/5/2019 0700  Gross per 24 hour   Intake --   Output 600 ml   Net -600 ml      Physical Exam   Constitutional: He is oriented to person, place, and time. He has a sickly appearance. No distress.   HENT:   Head: Normocephalic and atraumatic.   Eyes: Pupils are equal, round, and reactive to light.   Neck: Normal range of motion. No JVD present.   Cardiovascular: Normal rate and intact distal pulses.   No murmur heard.  Pulmonary/Chest: Effort normal and breath sounds normal. No respiratory distress. He has no wheezes.    Abdominal: Soft. Bowel sounds are normal. He exhibits no distension. There is no tenderness. There is no guarding.   Musculoskeletal: He exhibits no edema or tenderness.   Neurological: He is alert and oriented to person, place, and time.   Skin: Skin is warm and dry. Capillary refill takes 2 to 3 seconds. He is not diaphoretic.   Psychiatric: He has a normal mood and affect. His behavior is normal. Judgment and thought content normal.   Nursing note and vitals reviewed.      Significant Labs:   BMP:   Recent Labs   Lab 12/05/19  0636         K 3.9      CO2 28   BUN 10   CREATININE 0.8   CALCIUM 9.7   MG 2.0     CBC:   Recent Labs   Lab 12/04/19  0557 12/05/19  0636   WBC 7.97 7.21   HGB 10.9* 10.9*   HCT 34.8* 35.0*    315     Magnesium:   Recent Labs   Lab 12/04/19  0558 12/05/19  0636   MG 2.1 2.0     POCT Glucose:   Recent Labs   Lab 12/04/19  2124 12/05/19  0625 12/05/19  1120   POCTGLUCOSE 101 110 170*       Significant Imaging: I have reviewed all pertinent imaging results/findings within the past 24 hours.

## 2019-12-05 NOTE — PLAN OF CARE
spoke to Bella at [facility], [phone], informed SW that patient has been accepted . Bella stated she will reach out to patient to discuss Medicaid approval and paperwork. HERI sent over PHN /SNF Consult , spoke with Tamra ,[phone] informed that auth has been submitted. Awaiting PHN approval.       12/05/19 1029   Post-Acute Status   Post-Acute Authorization Placement   Post-Acute Placement Status Pending Payor Review

## 2019-12-06 PROBLEM — H04.129 DRY EYE SYNDROME: Status: ACTIVE | Noted: 2019-01-01

## 2019-12-06 NOTE — PLAN OF CARE
Problem: Physical Therapy Goal  Goal: Physical Therapy Goal  Description    Goals to be met by: 12/14/19   Goals updated 12/05/2019  1. Supine to sit with CGA  2. Sit to supine with SBA  3. Sit to stand transfer to RW with SBA  MET 12/6/19  4. Sit EOB 20 min with SBA   5.  Gait  X 150 feet with  SBA  using Rolling Walker.             Outcome: Ongoing, Progressing   Goal 3 met

## 2019-12-06 NOTE — PLAN OF CARE
Received patient upon rounds at 1920H, patient seen lying in bed on a high cordoba's position. Conscious , coherent to  time, date, place person , and situation. Patient pleasant, calm and cooperative. With no subjective complaint of pain.  With saline lock gauge 20 right forearm,  flushed patent,  with clean and dry dressing. With suprapubic catheter intact, dressing on the site cleansed and changed. Oxygen  93% on room air. Tele sitter On. Advised patient to call for any assistance. Call bell within reach. Bed alarm ON. Safety fall precaution maintained.Telemetry Sinus bradycardic 55-60's. Will continue to monitor patient.  0633H- No untoward signs and symptoms noted over the night.  Telemetry  Sinus bradycardic 49-50's , NP Esther updated no objection to give morning does of hydralazine. Free from fall. IV line patent.Will endorse patient to day shift Nurse.

## 2019-12-06 NOTE — NURSING
Discharge discussed with pt. Pt   acknowledged understanding of discharge medications. Gave report to Susi at [facility].   Pt with  0 distress at this time. IV removed with catheter intact.Denies pain or discomfort upon discharge. SPD here to  pt for transport to facility.

## 2019-12-06 NOTE — DISCHARGE SUMMARY
"[facility] Hospital Medicine  Discharge Summary      Patient Name: Reddy Barron  MRN: [id number]  Admission Date: 12/3/2019  Hospital Length of Stay: 0 days  Discharge Date and Time:  12/06/2019 9:23 AM  Attending Physician: Aline Singer*   Discharging Provider: Lane Palacios NP  Primary Care Provider: Edgar Fang MD      HPI:   Mr. Reddy Barron is a 75 y/o male who lives in [place], [place] at his residence. His PCP is Dr. Edgar Fang. He has a past medical history of hypertension, urinary retention, chronic indwelling villa, and diabetes. No documented surgeries noted. He denies smoking cigarettes, drinking alcohol, and he denies illicit drug use.    He presents to [facility] with complaints of frequent falls. The patient states he has had numerous falls recently because his legs "just give out" and almost had another fall today as well. Patient states he uses a walker to get around "anywhere further than a few feet." He denies any pain currently, headache, dizziness or LOC but does mention he is sensitive to coldness. The patient states he currently has a villa catheter in which was placed about a month or so ago, since he was in the hospital at [facility]. Patient states he is unable to bathe himself or get around as much as he would like to and is requesting to be placed in a nursing home.     The ED workup notes nothing acute on CXR. The labs note a stable hemoglobin, elevated troponin, and UTI as confirmed on U/A. Will admit to the [facility] department for further care.     * No surgery found *      Hospital Course:   He was admitted to the hospital medicine service for care. Ordered PT/OT---recommending SNF. Consulted SS to assist with placement. Will d/c to the nursing home on today. He is medically ready.      Consults:   Consults (From admission, onward)        Status Ordering Provider     Inpatient consult to Social " Work  Once     Provider:  (Not yet assigned)    Acknowledged DELFINA BAUTISTA     Inpatient consult to Social Work  Once     Provider:  (Not yet assigned)    Acknowledged ANASTASIA CALLEJAS     Inpatient consult to Spiritual Care  Once     Provider:  (Not yet assigned)    Completed INNOCENT-SATYA HARTLEY          * Falls frequently  Debility    Consulting PT/OT for recommendations---initially recommended SNF. It is noted that the patient is ambulating above 100 ft and will not be approved for SNF care. The patient will be considered for NH placement.    Dry eye syndrome  Will start Restasis 1 gtt bilateral eyes daily.       Type 2 diabetes mellitus without complication, without long-term current use of insulin  Resume oral medications Metformin for home use. Check hemoglobin A1c. Diabetic and cardiac diet ordered. Check BG 4 times daily.        Chronic indwelling Sousa catheter  Urinary retention  Acute cystitis without hematuria    Sousa catheter has been changed by the nursing staff.  We started Ceftriaxone, will d/c and order Augmentin BID x5 more days. Will also cont Flomax 0.4 mg PO daily.     Elevated troponin  Trend troponin. Likely elevated in response to UTI. Monitor on cardiac tele.       Hypertension  Resume home medications. He takes Metoprolol 50 mg PO BID and he takes Hydralazine 50 mg TID. Monitor.         Final Active Diagnoses:    Diagnosis Date Noted POA    PRINCIPAL PROBLEM:  Falls frequently [R29.6] 12/03/2019 Not Applicable    Dry eye syndrome [H04.129] 12/06/2019 Yes    Hypertension [I10] 12/03/2019 Yes    Elevated troponin [R79.89] 12/03/2019 Yes    Acute cystitis without hematuria [N30.00] 12/03/2019 Yes    Urinary retention [R33.9] 12/03/2019 Yes    Chronic indwelling Sousa catheter [Z96.0] 12/03/2019 Not Applicable    Debility [R53.81] 12/03/2019 Yes    Type 2 diabetes mellitus without complication, without long-term current use of insulin [E11.9] 12/03/2019 Yes       Problems Resolved During this Admission:       Discharged Condition: stable    Disposition: Home or Self Care    Follow Up:  Follow-up Information     Edgar Fang MD In 1 week.    Specialty:  Internal Medicine  Why:  hospital follow up  Contact information:  Gladys CORTEZ [id number] [phone]                 Patient Instructions:      Diet diabetic   Order Comments: With low sodium diet     Notify your health care provider if you experience any of the following:  temperature >100.4     Notify your health care provider if you experience any of the following:  persistent nausea and vomiting or diarrhea     Notify your health care provider if you experience any of the following:  severe uncontrolled pain     Notify your health care provider if you experience any of the following:  difficulty breathing or increased cough     Notify your health care provider if you experience any of the following:  severe persistent headache     Notify your health care provider if you experience any of the following:  worsening rash     Notify your health care provider if you experience any of the following:  persistent dizziness, light-headedness, or visual disturbances     Notify your health care provider if you experience any of the following:  increased confusion or weakness     Activity as tolerated       Significant Diagnostic Studies: Labs:   BMP:   Recent Labs   Lab 12/05/19  0636 12/06/19  0725    111*    141   K 3.9 3.8    107   CO2 28 25   BUN 10 13   CREATININE 0.8 0.7   CALCIUM 9.7 9.3   MG 2.0 2.1    and CBC   Recent Labs   Lab 12/05/19  0636 12/06/19  0725   WBC 7.21 6.45   HGB 10.9* 9.9*   HCT 35.0* 31.2*    294       Pending Diagnostic Studies:     None         Medications:  Reconciled Home Medications:      Medication List      START taking these medications    amoxicillin-clavulanate 875-125mg 875-125 mg per tablet  Commonly known as:  AUGMENTIN  Take 1 tablet  by mouth every 12 (twelve) hours. for 5 days     cycloSPORINE 0.05 % ophthalmic emulsion  Commonly known as:  RESTASIS  Place 0.4 mLs (1 drop total) into both eyes 2 (two) times daily.        CONTINUE taking these medications    atorvastatin 20 MG tablet  Commonly known as:  LIPITOR  Take 20 mg by mouth every evening.     docusate sodium 100 MG capsule  Commonly known as:  COLACE  Take 240 mg by mouth once daily.     hydrALAZINE 50 MG tablet  Commonly known as:  APRESOLINE  Take 50 mg by mouth every 8 (eight) hours.     meloxicam 7.5 MG tablet  Commonly known as:  MOBIC  Take 7.5 mg by mouth once daily.     metFORMIN 1000 MG tablet  Commonly known as:  GLUCOPHAGE  Take 500 mg by mouth 2 (two) times daily with meals.     metoprolol succinate 50 MG 24 hr tablet  Commonly known as:  TOPROL-XL  Take 50 mg by mouth 3 (three) times daily.     nitroGLYCERIN 0.4 MG SL tablet  Commonly known as:  NITROSTAT  Place 0.4 mg under the tongue every 15 (fifteen) minutes as needed for Chest pain.     OLANZapine 2.5 MG tablet  Commonly known as:  ZyPREXA  Take 2.5 mg by mouth every evening.     ondansetron 4 MG tablet  Commonly known as:  ZOFRAN  Take 4 mg by mouth every 8 (eight) hours.     oxybutynin 5 MG Tab  Commonly known as:  DITROPAN  Take 5 mg by mouth 2 (two) times daily.     sertraline 100 MG tablet  Commonly known as:  ZOLOFT  Take 100 mg by mouth once daily.     tamsulosin 0.4 mg Cap  Commonly known as:  FLOMAX  Take 0.4 mg by mouth once daily.     traZODone 150 MG tablet  Commonly known as:  DESYREL  Take 150 mg by mouth nightly.        STOP taking these medications    solifenacin 10 MG tablet  Commonly known as:  VESICARE            Indwelling Lines/Drains at time of discharge:   Lines/Drains/Airways     Drain                 Urethral Catheter -- days                Time spent on the discharge of patient: 35 minutes  Patient was seen and examined on the date of discharge and determined to be suitable for discharge.          Lane Palacios NP  Department of Hospital Medicine  [facility]

## 2019-12-06 NOTE — ASSESSMENT & PLAN NOTE
Urinary retention  Acute cystitis without hematuria    Sousa catheter has been changed by the nursing staff.  We started Ceftriaxone, will d/c and order Augmentin BID x5 more days. Will also cont Flomax 0.4 mg PO daily.

## 2019-12-06 NOTE — PT/OT/SLP PROGRESS
Physical Therapy Treatment    Patient Name:  Reddy Barron   MRN:  [id number]    Recommendations:     Discharge Recommendations:  nursing facility, skilled   Discharge Equipment Recommendations: none   Barriers to discharge: decreased mobility,strength and endurance    Assessment:     Reddy Barron is a 74 y.o. male admitted with a medical diagnosis of Falls frequently.  He presents with the following impairments/functional limitations:  weakness, impaired endurance, impaired functional mobilty, gait instability, impaired balance, decreased lower extremity function, impaired coordination,pt with good participation and improving status,pt remains with decreased mobility,strength and endurance and will benefit from SNF upon discharge.    Rehab Prognosis: Good; patient would benefit from acute skilled PT services to address these deficits and reach maximum level of function.    Recent Surgery: * No surgery found *      Plan:     During this hospitalization, patient to be seen 5 x/week to address the identified rehab impairments via gait training, therapeutic activities, therapeutic exercises, neuromuscular re-education and progress toward the following goals:    · Plan of Care Expires:  01/03/20    Subjective     Chief Complaint: n/a  Patient/Family Comments/goals: pt may leave today.  Pain/Comfort:  · Pain Rating 1: 0/10      Objective:     Communicated with nsg prior to session.  Patient found up in chair with villa catheter, telemetry(chair alarm) upon PT entry to room.     General Precautions: Standard, fall   Orthopedic Precautions:N/A   Braces: N/A     Functional Mobility:  · Transfers:     · Sit to Stand:  stand by assistance with rolling walker  · Gait: amb ~90' X 1 with RW and CGA/Min A  · Balance: fair standing balance with RW      AM-PAC 6 CLICK MOBILITY  Turning over in bed (including adjusting bedclothes, sheets and blankets)?: 3  Sitting down on and standing up from a chair with arms (e.g., wheelchair,  bedside commode, etc.): 3  Moving from lying on back to sitting on the side of the bed?: 3  Moving to and from a bed to a chair (including a wheelchair)?: 3  Need to walk in hospital room?: 3  Climbing 3-5 steps with a railing?: 2  Basic Mobility Total Score: 17       Therapeutic Activities and Exercises: le seated ex's x 10-12 reps inc: ap,laq,hip flex,abd/add.       Patient left up in chair with all lines intact, call button in reach and chair alarm on..    GOALS: see general POC  Multidisciplinary Problems     Physical Therapy Goals        Problem: Physical Therapy Goal    Goal Priority Disciplines Outcome Goal Variances Interventions   Physical Therapy Goal     PT, PT/OT Ongoing, Progressing     Description:    Goals to be met by: 12/14/19   Goals updated 12/05/2019  1. Supine to sit with CGA  2. Sit to supine with SBA  3. Sit to stand transfer to RW with SBA  MET 12/6/19  4. Sit EOB 20 min with SBA   5.  Gait  X 150 feet with  SBA  using Rolling Walker.                              Time Tracking:     PT Received On: 12/06/19  PT Start Time: 1003     PT Stop Time: 1027  PT Total Time (min): 24 min     Billable Minutes: Gait Training 14 and Therapeutic Exercise 10    Treatment Type: Treatment  PT/PTA: PTA     PTA Visit Number: 1     Zohaib Denny, KACY  12/06/2019

## 2019-12-06 NOTE — PT/OT/SLP PROGRESS
Occupational Therapy   Treatment    Name: Reddy Barron  MRN: [id number]  Admitting Diagnosis:  Falls frequently       Recommendations:     Discharge Recommendations: nursing facility, skilled, nursing facility, basic  Discharge Equipment Recommendations:  (defer to NH)  Barriers to discharge:  Decreased caregiver support    Assessment:   Pt cont w/ distractibility & req'ing Mod vs's for re-direction during tx tasks. Pt will require 24/7 Sup/A 2/2 impaired attn to task & STM deficits. Cont w/ OT per POC.    Reddy Barron is a 74 y.o. male with a medical diagnosis of Falls frequently.  He presents with . Performance deficits affecting function are weakness, impaired endurance, gait instability, impaired functional mobilty, impaired self care skills, impaired balance, impaired cognition, decreased safety awareness.     Rehab Prognosis:  Fair; patient would benefit from acute skilled OT services to address these deficits and reach maximum level of function.       Plan:     Patient to be seen 5 x/week to address the above listed problems via self-care/home management, therapeutic activities, therapeutic exercises  · Plan of Care Expires: 01/05/20  · Plan of Care Reviewed with: patient    Subjective     Pain/Comfort:  · Pain Rating 1: 0/10  · Pain Rating Post-Intervention 1: 0/10    Objective:     Communicated with: nsg prior to session.  Patient found HOB elevated with bed alarm, telemetry, villa catheter upon OT entry to room.    General Precautions: Standard, fall   Orthopedic Precautions:N/A   Braces: N/A     Occupational Performance:     Bed Mobility:    · Patient completed Supine to Sit with moderate assistance     Functional Mobility/Transfers:  · Patient completed Sit <> Stand Transfer with contact guard assistance  with  rolling walker   · Patient completed Bed <> Chair Transfer using Step Transfer technique with contact guard assistance and minimum assistance with rolling walker  · Functional Mobility:      Activities of Daily Living:  · Feeding:  modified independence after general set up      AMPAC 6 Click ADL: 15    Treatment & Education:  Pt found in supine & agreeable to get OOB for breakfast this AM. Pt w/o c/o pain & perf the following: sup-->EOB w/ Mod A; standing via RW w/ CGA & safety vc's; SPT via RW-->b/s chair w/ CGA for balance & Min A for DME mgmt; self feed w/ MI after S/U. Edu/tx re: general safety techs & HEP. Pt verabalized understanding. Pt left [facility] w/ alarm & nsg notified.    Patient left up in chair with all lines intact, call button in reach, chair alarm on and nsg notifiedEducation:      GOALS:   Multidisciplinary Problems     Occupational Therapy Goals        Problem: Occupational Therapy Goal    Goal Priority Disciplines Outcome Interventions   Occupational Therapy Goal     OT, PT/OT Ongoing, Progressing    Description:  Goals to be met by: 01/05     Patient will increase functional independence with ADLs by performing:    LE Dressing with Minimal Assistance.  Grooming while standing with Stand-by Assistance.  Toileting from toilet with Stand-by Assistance for hygiene and clothing management.   Toilet transfer to toilet with Contact Guard Assistance.  Increased functional strength to WFL for ADLs.                      Time Tracking:     OT Date of Treatment: 12/06/19  OT Start Time: 0847  OT Stop Time: 0912  OT Total Time (min): 25 min    Billable Minutes:Therapeutic Activity 25  Total Time 25    THEODORA Ewing  12/6/2019

## 2019-12-06 NOTE — PLAN OF CARE
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       Allergies:Review of patient's allergies indicates:  No Known Allergies    Diet: diabetic diet: 2000 calorie  And low sodium    Activities: Activity as tolerated and Bathroom privileges with assistance    Nursing: OOB to the chair TID  Ambulate with assistance  Check vitals q shift  Fall precautions  Assist with meals  Check BG Ac/Hs       CONSULTS:    Physical Therapy to evaluate and treat.  and Occupational Therapy to evaluate and treat.    Medications: Review discharge medications with patient and family and provide education.      Current Discharge Medication List      START taking these medications    Details   amoxicillin-clavulanate 875-125mg (AUGMENTIN) 875-125 mg per tablet Take 1 tablet by mouth every 12 (twelve) hours. for 5 days  Qty: 10 tablet, Refills: 0   Start 12/06/2019 at 2100 and end 12/11/2019 at 0900   cycloSPORINE (RESTASIS) 0.05 % ophthalmic emulsion Place 0.4 mLs (1 drop total) into both eyes 2 (two) times daily.  Qty: 1 each, Refills: 11         CONTINUE these medications which have NOT CHANGED    Details   atorvastatin  (LIPITOR) 20 MG tablet Take 20 mg by mouth every evening.      docusate sodium (COLACE) 100 MG capsule Take 240 mg by mouth once daily.      hydrALAZINE (APRESOLINE) 50 MG tablet Take 50 mg by mouth every 8 (eight) hours.      metFORMIN (GLUCOPHAGE) 1000 MG tablet Take 500 mg by mouth 2 (two) times daily with meals.      metoprolol succinate (TOPROL-XL) 50 MG 24 hr tablet Take 50 mg by mouth 3 (three) times daily.      OLANZapine (ZYPREXA) 2.5 MG tablet Take 2.5 mg by mouth every evening.      oxybutynin (DITROPAN) 5 MG Tab Take 5 mg by mouth 2 (two) times daily.      sertraline (ZOLOFT) 100 MG tablet Take 100 mg by mouth once daily.      tamsulosin (FLOMAX) 0.4 mg Cap Take 0.4 mg by mouth once daily.      traZODone (DESYREL) 150 MG tablet Take 150 mg by mouth nightly.      meloxicam (MOBIC) 7.5 MG tablet Take 7.5 mg by mouth once daily.      nitroGLYCERIN (NITROSTAT) 0.4 MG SL tablet Place 0.4 mg under the tongue every 15 (fifteen) minutes as needed for Chest pain.      ondansetron (ZOFRAN) 4 MG tablet Take 4 mg by mouth every 8 (eight) hours.         STOP taking these medications       solifenacin (VESICARE) 10 MG tablet Comments:   Reason for Stopping:                    _________________________________  Lane Palacios NP  12/06/2019

## 2019-12-06 NOTE — PLAN OF CARE
Problem: Occupational Therapy Goal  Goal: Occupational Therapy Goal  Description  Goals to be met by: 01/05     Patient will increase functional independence with ADLs by performing:    LE Dressing with Minimal Assistance.  Grooming while standing with Stand-by Assistance.  Toileting from toilet with Stand-by Assistance for hygiene and clothing management.   Toilet transfer to toilet with Contact Guard Assistance.  Increased functional strength to WFL for ADLs.     Outcome: Ongoing, Progressing   Pt found in supine & agreeable to get OOB for breakfast this AM. Pt w/o c/o pain & perf the following: sup-->EOB w/ Mod A; standing via RW w/ CGA & safety vc's; SPT via RW-->b/s chair w/ CGA for balance & Min A for DME mgmt; self feed w/ MI after S/U. Edu/tx re: general safety techs & HEP. Pt verabalized understanding. Pt left [facility] w/ alarm & nsg notified.    Pt cont w/ distractibility & req'ing Mod vs's for re-direction during tx tasks. Pt will require 24/7 Sup/A 2/2 impaired attn to task & STM deficits. Cont w/ OT per POC.

## 2019-12-06 NOTE — ASSESSMENT & PLAN NOTE
Resume oral medications Metformin for home use. Check hemoglobin A1c. Diabetic and cardiac diet ordered. Check BG 4 times daily.

## 2020-01-01 ENCOUNTER — HOSPITAL ENCOUNTER (EMERGENCY)
Facility: HOSPITAL | Age: 75
End: 2020-03-10
Attending: EMERGENCY MEDICINE
Payer: MEDICARE

## 2020-01-01 VITALS
WEIGHT: 150 LBS | DIASTOLIC BLOOD PRESSURE: 33 MMHG | SYSTOLIC BLOOD PRESSURE: 73 MMHG | HEART RATE: 42 BPM | OXYGEN SATURATION: 29 % | RESPIRATION RATE: 15 BRPM | BODY MASS INDEX: 21.52 KG/M2

## 2020-01-01 DIAGNOSIS — R53.1 WEAK: ICD-10-CM

## 2020-01-01 DIAGNOSIS — I46.9 CARDIAC ARREST: Primary | ICD-10-CM

## 2020-01-01 PROCEDURE — 92950 HEART/LUNG RESUSCITATION CPR: CPT

## 2020-01-01 PROCEDURE — 99291 CRITICAL CARE FIRST HOUR: CPT | Mod: 25

## 2020-01-01 PROCEDURE — 63600175 PHARM REV CODE 636 W HCPCS: Performed by: EMERGENCY MEDICINE

## 2020-01-01 PROCEDURE — 25000003 PHARM REV CODE 250: Performed by: EMERGENCY MEDICINE

## 2020-01-01 PROCEDURE — 99900035 HC TECH TIME PER 15 MIN (STAT)

## 2020-01-01 RX ORDER — EPINEPHRINE 0.1 MG/ML
INJECTION INTRAVENOUS CODE/TRAUMA/SEDATION MEDICATION
Status: COMPLETED | OUTPATIENT
Start: 2020-01-01 | End: 2020-01-01

## 2020-01-01 RX ORDER — CALCIUM CHLORIDE INJECTION 100 MG/ML
INJECTION, SOLUTION INTRAVENOUS CODE/TRAUMA/SEDATION MEDICATION
Status: COMPLETED | OUTPATIENT
Start: 2020-01-01 | End: 2020-01-01

## 2020-01-01 RX ORDER — SODIUM BICARBONATE 1 MEQ/ML
SYRINGE (ML) INTRAVENOUS CODE/TRAUMA/SEDATION MEDICATION
Status: COMPLETED | OUTPATIENT
Start: 2020-01-01 | End: 2020-01-01

## 2020-01-01 RX ADMIN — EPINEPHRINE 1 MG: 0.1 INJECTION INTRACARDIAC; INTRAVENOUS at 04:03

## 2020-01-01 RX ADMIN — SODIUM BICARBONATE 50 MEQ: 84 INJECTION, SOLUTION INTRAVENOUS at 04:03

## 2020-01-01 RX ADMIN — SODIUM CHLORIDE 1000 ML: 0.9 INJECTION, SOLUTION INTRAVENOUS at 04:03

## 2020-01-01 RX ADMIN — CALCIUM CHLORIDE 1 G: 100 INJECTION, SOLUTION INTRAVENOUS; INTRAVENTRICULAR at 04:03

## 2020-03-10 NOTE — ED PROVIDER NOTES
SCRIBE #1 NOTE: I, Cynthia Mccauley, am scribing for, and in the presence of,  Dr. Nunez. I have scribed the entire note.         Chief Complaint  Chief Complaint   Patient presents with    Cardiac Arrest     74y M to ED via  EMS. CPR in progress, thomas device to pt. 7 Epi, 80mg Bicarb, 4mg narcan       HPI  Reddy Barron is a 74 y.o. male who presents with cardiac arrest. Per EMS patient was down for 15 minutes before they arrived, and had CPR initiated by police. Patient was given 7 EPI, 80mg Bicarb, and 4mg Narcan en route. Patient was down for approximately 45 minutes upon arrival. Patient was brought in intubated and with Thomas in progress. Per EMS, they were able to get the pulse back for 2 minutes, and then it was lost. EMS denies any trauma, and notes they pick him up about 3x a week.   Time of expiration: 5:14 AM.    Past medical history  Past Medical History:   Diagnosis Date    Diabetes mellitus     Hypertension     Urinary retention        Current Medications  No current facility-administered medications for this encounter.     Current Outpatient Medications:     atorvastatin (LIPITOR) 20 MG tablet, Take 20 mg by mouth every evening., Disp: , Rfl:     cycloSPORINE (RESTASIS) 0.05 % ophthalmic emulsion, Place 0.4 mLs (1 drop total) into both eyes 2 (two) times daily., Disp: 1 each, Rfl: 11    docusate sodium (COLACE) 100 MG capsule, Take 240 mg by mouth once daily., Disp: , Rfl:     hydrALAZINE (APRESOLINE) 50 MG tablet, Take 50 mg by mouth every 8 (eight) hours., Disp: , Rfl:     meloxicam (MOBIC) 7.5 MG tablet, Take 7.5 mg by mouth once daily., Disp: , Rfl:     metFORMIN (GLUCOPHAGE) 1000 MG tablet, Take 500 mg by mouth 2 (two) times daily with meals., Disp: , Rfl:     metoprolol succinate (TOPROL-XL) 50 MG 24 hr tablet, Take 50 mg by mouth 3 (three) times daily., Disp: , Rfl:     nitroGLYCERIN (NITROSTAT) 0.4 MG SL tablet, Place 0.4 mg under the tongue every 15 (fifteen) minutes as  needed for Chest pain., Disp: , Rfl:     OLANZapine (ZYPREXA) 2.5 MG tablet, Take 2.5 mg by mouth every evening., Disp: , Rfl:     ondansetron (ZOFRAN) 4 MG tablet, Take 4 mg by mouth every 8 (eight) hours., Disp: , Rfl:     oxybutynin (DITROPAN) 5 MG Tab, Take 5 mg by mouth 2 (two) times daily., Disp: , Rfl:     sertraline (ZOLOFT) 100 MG tablet, Take 100 mg by mouth once daily., Disp: , Rfl:     tamsulosin (FLOMAX) 0.4 mg Cap, Take 0.4 mg by mouth once daily., Disp: , Rfl:     traZODone (DESYREL) 150 MG tablet, Take 150 mg by mouth nightly., Disp: , Rfl:     Allergies  Review of patient's allergies indicates:  No Known Allergies    Surgical history  No past surgical history on file.    Social history  Social History     Socioeconomic History    Marital status:      Spouse name: Not on file    Number of children: Not on file    Years of education: Not on file    Highest education level: Not on file   Occupational History    Not on file   Social Needs    Financial resource strain: Not on file    Food insecurity:     Worry: Not on file     Inability: Not on file    Transportation needs:     Medical: Not on file     Non-medical: Not on file   Tobacco Use    Smoking status: Former Smoker    Smokeless tobacco: Never Used   Substance and Sexual Activity    Alcohol use: No    Drug use: No    Sexual activity: Not Currently     Partners: Female   Lifestyle    Physical activity:     Days per week: Not on file     Minutes per session: Not on file    Stress: Not on file   Relationships    Social connections:     Talks on phone: Not on file     Gets together: Not on file     Attends [employer] service: Not on file     Active member of club or organization: Not on file     Attends meetings of clubs or organizations: Not on file     Relationship status: Not on file   Other Topics Concern    Not on file   Social History Narrative    Not on file       Family History  Family History   Family history  unknown: Yes       Review of systems  Unable to obtain due to patient unresponsiveness.    Physical Exam   Vital signs: BP (!) 73/33   Pulse (!) 42 Comment: unable to obtain a bp on patient anymore  Resp 15 Comment: unable to obtain a bp on patient anymore  Wt 68 kg (150 lb)   SpO2 (!) 29% Comment: unable to obtain a bp on patient anymore  BMI 21.52 kg/m²   Constitutional:  Unresponsive  HENT: Normocephalic, atraumatic. Normal ear, nose, and throat.  Eyes:  No reactivity  Neck: Normal range of motion, no tenderness; supple.  Respiratory:  Intubated with equal bilateral breath sounds using bag valve mask  Cardiovascular:  No pulse  GI: Soft, nontender, no rebound or guarding.  Musculoskeletal: Normal ROM, no tenderness, injury, or edema.  Skin: Warm, dry skin without infection or injury.  Neurologic:  No neurologic response      Labs  Pertinent labs reviewed (see chart for details)  Labs Reviewed - No data to display    ECG  Results for orders placed or performed during the hospital encounter of 12/03/19   EKG 12-lead    Collection Time: 12/03/19 11:45 AM    Narrative    Test Reason : R53.1,    Vent. Rate : 053 BPM     Atrial Rate : 053 BPM     P-R Int : 208 ms          QRS Dur : 126 ms      QT Int : 512 ms       P-R-T Axes : -07 -04 268 degrees     QTc Int : 480 ms    Sinus bradycardia  LVH with QRS widening and repolarization abnormality  Abnormal ECG  No previous ECGs available  Confirmed by Florentin Mitchell MD ([id number]) on 12/4/2019 8:57:16 AM    Referred By: [email or web address]   SELF           Confirmed By:Florentin Mitchell MD     ECG interpreted by ED MD    Radiology    No orders to display       Procedures  Procedures    Medications   EPINEPHrine 0.1 mg/mL injection (1 mg Intravenous Given 3/10/20 3945)   sodium bicarbonate 8.4 % (1 mEq/mL) injection (50 mEq Intravenous Given 3/10/20 4390)   calcium chloride 100 mg/mL (10 %) injection (1 g Intravenous Given 3/10/20 8756)   sodium chloride 0.9% bolus (1,000  mLs Intravenous New Bag 3/10/20 0449)       ED course and medical decision making         Patient was coded in the field for over 45 min.  Multiple drugs were given including over 7 epis.  Blood sugar in the field was reasonable.  They did get return of spontaneous circulation for 2 min total out of the 45 min plus of working the code on the patient.  On arrival to the ER, he still had a Thomas delivering compressions and was intubated.  He had PEa.  We gave multiple medications and 1 time the patient went into V-tach; we delivered 1 shock.  There was little contractility(1-3%) of the heart via my bedside ultrasound.  We continued our resuscitation measures and did get a brief return of pulse after multiple doses of epi.  Ultimately, the patient became asystolic.  Total time of resuscitation over an hour.  Attempted to contact wife via to defer numbers on patient information sheet but no answer.  Time of death called at 5:14 a.m.    Disposition    Patient .      Final impression  1. Cardiac arrest    2. Weak        Critical care time spent with this patient was 30 minutes excluding the procedure time.     Scribe attest  I, Raymundo Nunez,  personally performed the services described in this documentation. All medical record entries made by the scribe were at my direction and in my presence.  I have reviewed the chart and agree that the record reflects my personal performance and is accurate and complete. Raymundo Nunez M.D. 5:09 AM03/10/2020         Raymundo Nunez MD  03/10/20 0557

## 2020-03-10 NOTE — CHAPLAIN
Spoke with patient's brother Luis Alberto Barron and with Fiordaliza Underwood patient's S.O.  They are having Triny Linda  patient's body.  They have an appointment with Triny at 1:30 today. I gave contact information on patient's brother to the [facility], [facility], and he release to the  home.  I spoke with Emma from [facility] and gave her brother's contact info as well. She will call me back to give me their decision.  Luis Alberto Pearson's address is [address] [place] [address].  His phone number is [phone]..  Brother and Fiordaliza were accepting on patient's death, and they seemed to be working together on the arrangements.

## 2020-12-23 NOTE — ED NOTES
Attempted to paged Fiordaliza Robins, who is listed as patien's significant other for more information about next of kin. No answer. Will continue to call.   
Dr. Nunez attempted to contact patient's significant other to inform her of his death, but was unable to reach her.   
Dr. Nunez pronounced the patient . No spontaneous respirations noted, no pulses felt. Patient's rhythm is PEA.   
Notified by AC (Lucrecia) that  was able to contact a family member. [facility]  will relay information to coronor's office.   
Pt placed in body bag and labled with patient tag. 's license and health insurance card placed in bag as well.   
[place] [facility]  
Spoke with [facility]  
Spoke with [facility] mascorro and [facility] coroners office. Guillermo with [facility] stated that patient was a cadidate for tissue and eye donation, but having to wait for the 's office to release the body. Nicci with the 's office wants us to continue to try and contact the lady, Fiordaliza, listed as patient's significant other and emergency contact. Nicci wants us to find out if she is patient's wife and if not, who is the next of kin. She instructed me not to remove anything from the patient. Patient left with ET tube in place, and all clothes that patient came in with remain on him. IV x2 remain in place as well. Patient layed flat, eyes closed and sheet placed over patient for dignity.   
Spoke with Nicci again with the 's office and informed her that I had tried again to contact patient's significant other with no success. She asked that we keep trying and that she would look to see if she could find another way to contact someone.     Report given to Tyra RN, oncoming nurse. Informed her about the situation regarding not being able to contact family, and that the 's office wants her to keep trying and that they will call her back in alittle while.   
, lives with family.  No smoking, alcohol or illicit drug use.

## 2022-12-15 NOTE — PLAN OF CARE
spoke with Bella with [facility], [phone],informed SW that she will be in as close to 10 AM to sign paperwork with patient for [facility] care. HERI sent over orders, awaiting review from facility. HERI called bedside nurse Lynette, informed of number for report, [phone] to nurse for RM [id number]. HERI informed nurse that transportation has been set for 12:00 PM. Nurse verbalized understanding.    10:55 AM-HERI met with patient to sign and discuss Patient Choice Form. Paperwork is being signed with Bella from [facility].     12/06/19 0933   Final Note   Assessment Type Final Discharge Note   Anticipated Discharge Disposition correction Nu   What phone number can be called within the next 1-3 days to see how you are doing after discharge? [phone]   Hospital Follow Up  Appt(s) scheduled? No   Discharge plans and expectations educations in teach back method with documentation complete? Yes   Right Care Referral Info   Post Acute Recommendation Other   Facility Name [facility]   Street [address] [place] [address]       There are no Wet Read(s) to document.